# Patient Record
Sex: MALE | Race: WHITE
[De-identification: names, ages, dates, MRNs, and addresses within clinical notes are randomized per-mention and may not be internally consistent; named-entity substitution may affect disease eponyms.]

---

## 2017-09-05 ENCOUNTER — HOSPITAL ENCOUNTER (OUTPATIENT)
Dept: HOSPITAL 61 - KCIC | Age: 52
Discharge: HOME | End: 2017-09-05
Attending: FAMILY MEDICINE
Payer: COMMERCIAL

## 2017-09-05 DIAGNOSIS — J45.30: Primary | ICD-10-CM

## 2017-09-05 PROCEDURE — 71020: CPT

## 2017-09-05 NOTE — [facility]
CHEST PA   LATERAL

 

History: Asthma, cough for about 12 months

 

Comparison: None.

 

Findings:

There is no dependent pleural fluid or pneumothorax. There is opacity of 

the mid left hemithorax with ill-defined left heart border. Cardiac 

silhouette is within normal limits.

 

Impression: 

 

1.  There is abnormal opacity of the mid left hemithorax with obscuration 

of left heart border which may be due to to underlying infiltrate of the 

left upper lobe, no older exams to evaluate for change. Follow-up after 

treatment is advised unless old outside facility exams exams to confirm 

stability

 

Electronically signed by: Geraldo Hernandez MD (9/5/2017 1:50 PM) [facility]

## 2020-01-30 ENCOUNTER — HOSPITAL ENCOUNTER (OUTPATIENT)
Dept: HOSPITAL 61 - KCIC | Age: 55
Discharge: HOME | End: 2020-01-30
Attending: INTERNAL MEDICINE
Payer: COMMERCIAL

## 2020-01-30 DIAGNOSIS — J98.11: Primary | ICD-10-CM

## 2020-01-30 PROCEDURE — 71046 X-RAY EXAM CHEST 2 VIEWS: CPT

## 2020-01-30 NOTE — [facility]
EXAM: CHEST 2 VIEWS.

 

HISTORY: Productive cough.

 

COMPARISON: 09/05/2017, 06/29/2005.

 

FINDINGS: Frontal and lateral views of the chest are obtained.

 

A chronic opacity along the left perihilar region corresponds with chronic

left upper lobe atelectasis on examinations dating back through 2005. This

is not clearly changed. The left anterior fifth rib is bifid. A nodular 

focus projecting between the right anterior fifth and sixth ribs is also 

unchanged and may represent a nipple shadow. No acute infiltrates are 

identified. There is no pneumothorax or pleural effusion. The heart is not

enlarged.

 

IMPRESSION:

1. Atelectasis in the left upper lobe appears stable chronically. No acute

infiltrates are seen. CT is more sensitive if there is persistent concern.

 

Electronically signed by: HUEY Zpaata MD (1/30/2020 2:29 PM) [facility]

## 2020-03-05 ENCOUNTER — HOSPITAL ENCOUNTER (OUTPATIENT)
Dept: HOSPITAL 61 - KCIC CT | Age: 55
Discharge: HOME | End: 2020-03-05
Attending: INTERNAL MEDICINE
Payer: COMMERCIAL

## 2020-03-05 DIAGNOSIS — J18.8: Primary | ICD-10-CM

## 2020-03-05 DIAGNOSIS — R91.8: ICD-10-CM

## 2020-03-05 DIAGNOSIS — J47.9: ICD-10-CM

## 2020-03-05 PROCEDURE — 71260 CT THORAX DX C+: CPT

## 2020-03-05 NOTE — [facility]
EXAM: CT Chest with IV contrast

 

INDICATION: Pulmonary infiltrate.

 

TECHNIQUE:  Multi-detector row CT images were acquired from the thoracic 

inlet through the upper abdomen with the use of IV contrast. Sagittal and 

coronal images were acquired from the transaxial data. All CT scans 

performed at this facility utilize dose optimization techniques as 

appropriate to the exam, including the following: Automated exposure 

control and adjustment of the mA and/or KV according to patient size (this

includes techniques or standardized protocols for targeted exams where 

dose is indication/reason for exam).

 

IV CONTRAST: Administered

 

COMPARISON: Chest CT of 6/29/2005 and chest x-ray of 1/30/2020.

 

FINDINGS: 

CARDIOVASCULAR:  Unremarkable

 

MEDIASTINUM & KRISTINE: No adenopathy or masses.

 

LUNGS: Progression in volume loss and consolidation in the left upper lobe

with wedge-shaped groundglass attenuation and air bronchograms. There is 

some mild bronchiectasis but no cavitation or air-fluid levels. Additional

patchy groundglass opacities are present in the right middle and posterior

right upper lobe.

 

PLEURAL SPACE: No pleural effusions or pneumothorax.

 

OSSEOUS & SOFT TISSUE: Unremarkable

 

ABDOMEN:  The visualized portions of the upper abdomen are unremarkable.

 

IMPRESSION:

 

Multifocal pneumonia, primarily involving the left upper lobe where it is 

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

Follow-up to resolution is recommended. Malignancy considered less likely.

 

 

**********FOR INTERNAL CODING PURPOSES**********

 

Critical result:

 

Findings discussed with  GABBY LEHMAN at 3/5/2020 4:36 PM.

 

RESULT CODE: (C)  

 

 

 

 

 

 

Electronically signed by: Bambi Monroe MD (3/5/2020 4:41 PM) 

[id number]

## 2020-03-10 ENCOUNTER — HOSPITAL ENCOUNTER (OUTPATIENT)
Dept: HOSPITAL 61 - SURG | Age: 55
Discharge: HOME | End: 2020-03-10
Attending: INTERNAL MEDICINE
Payer: COMMERCIAL

## 2020-03-10 VITALS
SYSTOLIC BLOOD PRESSURE: 126 MMHG | DIASTOLIC BLOOD PRESSURE: 74 MMHG | DIASTOLIC BLOOD PRESSURE: 74 MMHG | SYSTOLIC BLOOD PRESSURE: 126 MMHG

## 2020-03-10 DIAGNOSIS — E78.5: ICD-10-CM

## 2020-03-10 DIAGNOSIS — Z79.899: ICD-10-CM

## 2020-03-10 DIAGNOSIS — K21.9: ICD-10-CM

## 2020-03-10 DIAGNOSIS — I10: ICD-10-CM

## 2020-03-10 DIAGNOSIS — Z98.52: ICD-10-CM

## 2020-03-10 DIAGNOSIS — E78.00: ICD-10-CM

## 2020-03-10 DIAGNOSIS — J98.09: Primary | ICD-10-CM

## 2020-03-10 DIAGNOSIS — Z98.890: ICD-10-CM

## 2020-03-10 DIAGNOSIS — J18.9: ICD-10-CM

## 2020-03-10 DIAGNOSIS — J45.909: ICD-10-CM

## 2020-03-10 LAB
APTT BLD: 28 SEC (ref 24–38)
BASOPHILS # BLD AUTO: 0.1 X10^3/UL (ref 0–0.2)
BASOPHILS NFR BLD: 1 % (ref 0–3)
EOSINOPHIL NFR BLD: 0.4 X10^3/UL (ref 0–0.7)
EOSINOPHIL NFR BLD: 5 % (ref 0–3)
ERYTHROCYTE [DISTWIDTH] IN BLOOD BY AUTOMATED COUNT: 15.2 % (ref 11.5–14.5)
HCT VFR BLD CALC: 46.2 % (ref 39–53)
HGB BLD-MCNC: 15.4 G/DL (ref 13–17.5)
LYMPHOCYTES # BLD: 0.9 X10^3/UL (ref 1–4.8)
LYMPHOCYTES NFR BLD AUTO: 13 % (ref 24–48)
MCH RBC QN AUTO: 30 PG (ref 25–35)
MCHC RBC AUTO-ENTMCNC: 33 G/DL (ref 31–37)
MCV RBC AUTO: 91 FL (ref 79–100)
MONO #: 0.8 X10^3/UL (ref 0–1.1)
MONOCYTES NFR BLD: 11 % (ref 0–9)
NEUT #: 5.3 X10^3/UL (ref 1.8–7.7)
NEUTROPHILS NFR BLD AUTO: 71 % (ref 31–73)
PLATELET # BLD AUTO: 215 X10^3/UL (ref 140–400)
PROTHROMBIN TIME: 12.5 SEC (ref 11.7–14)
RBC # BLD AUTO: 5.11 X10^6/UL (ref 4.3–5.7)
WBC # BLD AUTO: 7.5 X10^3/UL (ref 4–11)

## 2020-03-10 PROCEDURE — 85025 COMPLETE CBC W/AUTO DIFF WBC: CPT

## 2020-03-10 PROCEDURE — 31624 DX BRONCHOSCOPE/LAVAGE: CPT

## 2020-03-10 PROCEDURE — 94640 AIRWAY INHALATION TREATMENT: CPT

## 2020-03-10 PROCEDURE — 31622 DX BRONCHOSCOPE/WASH: CPT

## 2020-03-10 PROCEDURE — 85730 THROMBOPLASTIN TIME PARTIAL: CPT

## 2020-03-10 PROCEDURE — 87102 FUNGUS ISOLATION CULTURE: CPT

## 2020-03-10 PROCEDURE — 82787 IGG 1 2 3 OR 4 EACH: CPT

## 2020-03-10 PROCEDURE — 36415 COLL VENOUS BLD VENIPUNCTURE: CPT

## 2020-03-10 PROCEDURE — 82103 ALPHA-1-ANTITRYPSIN TOTAL: CPT

## 2020-03-10 PROCEDURE — 87116 MYCOBACTERIA CULTURE: CPT

## 2020-03-10 PROCEDURE — 85610 PROTHROMBIN TIME: CPT

## 2020-03-10 PROCEDURE — 87205 SMEAR GRAM STAIN: CPT

## 2020-03-10 PROCEDURE — 88112 CYTOPATH CELL ENHANCE TECH: CPT

## 2020-03-10 PROCEDURE — 87070 CULTURE OTHR SPECIMN AEROBIC: CPT

## 2020-03-10 PROCEDURE — 82784 ASSAY IGA/IGD/IGG/IGM EACH: CPT

## 2020-03-10 NOTE — OP
DATE OF SURGERY:  03/10/2020



PROCEDURE:  Bronchoscopy.



INDICATIONS:  Mucus plug, abnormal CT chest.



DESCRIPTION OF PROCEDURE:  Informed consent was obtained from the patient.  All

risks and benefits were explained.  He agreed to proceed with the procedure. 

Propofol was used for sedation by Anesthesia.  Bronchoscope was introduced

through the right nostril.  The upper airway was passed.  Vocal cords moves

equally with respiration.  The trachea was entered.  Copious amount of white

secretion was seen at the distal trachea as well as at the mainstem and at the

damian.  Right lung was first examined and secretions from the proximal right

mainstem were aspirated and removed.  Upon inspection of the right upper, right

middle and right lower lobe, no purulent secretions seen.  The airway appeared

patent and no endobronchial lesion seen.  Bronchoscope was then introduced into

the left lung.  Again seen were copious amount of white secretions in the left

main stem bronchus.  Upon further inspection of the left lung, the lingula

appears very inflamed and slightly narrow.  No definite endobronchial lesion

seen.  Bronchoalveolar lavage x 2 was performed from multiple subsegments of the

lingula.  The left upper lobe had minimal white secretions and the left lower

lobe, especially superior segment, also had minimal white secretions.  They all

were aspirated.  No endobronchial lesion seen.  The patient tolerated the

procedure well.



IMPRESSION:

1.  Copious amount of white secretions seen in the distal trachea, damian and

both the main stem bronchus.  The secretions were also present in the lingula.

2.  Abnormal lingula with inflamed mucosa and narrowing of the lumen suggesting

bronchomalacia vs related to bronchial atresia.  No definite endobronchial 
lesion seen.

3.  Bronchoalveolar lavage performed from the lingula x 2.  Bronchial wash was

also performed from the main stem bronchus.  We will follow the culture results.

 



______________________________

GABBY LEHMAN MD



DR:  GIOVANA/teresa  JOB#:  [id number] / [id number]

DD:  03/10/2020 13:15  DT:  03/10/2020 13:33

DARA

## 2020-03-11 LAB
IGG SERPL-MCNC: 842 MG/DL (ref 700–1600)
IGG1 SER-MCNC: 483 MG/DL (ref 248–810)
IGG2 SER-MCNC: 271 MG/DL (ref 130–555)
IGG3 SER-MCNC: 29 MG/DL (ref 15–102)
IGG4 SER-MCNC: 12 MG/DL (ref 2–96)

## 2020-03-11 NOTE — PATHOLOGY
Note

[facility] Accession Number: [id number]

   TESTS               RESULT  FLAG  UNITS    REF RANGE  LAB

------------------------------------------------------------

   Clinician Provided Cytology Information

   No. of containers..01 Other (Miscellaneous)

Source:                                                   01

   BRONCH WASH MAINSTM

DIAGNOSIS:                                                02

   BRONCH WASH MAINSTM

   NEGATIVE FOR MALIGNANT CELLS.

   PULMONARY MACROPHAGES (DUST CELLS) ARE PRESENT.

   NORMAL BRONCHIAL CELLS AND MACROPHAGES ARE PRESENT.

Signed out by:                                            02

   Aleksey Vogel MD, Pathologist

   NPI- [id number]

Performed by:                                             01

   Ct Ross, Cytotechnologist ([facility])

Gross description:                                        01

   5ML, CLEAR COLORLESS, 1 TP

   /LCS  03/10/2020  1850 Local



------------------------------------------------------------

    FLAG LEGEND:

    L-Low Normal,H-High Normal,LL-Alert Low,HH-Alert High

    <-Panic Low,>-Panic High,A-Abnormal,AA-Critical Abnormal

------------------------------------------------------------



Performed at:

[address]

   [place]  [address]

   Alex Ring MD, Phone: [phone]

02 [facility]

   [address] [place]  [address]

   Aleksey Vogel MD, Phone: [phone]

Specimen Comment: A courtesy copy of this report has been sent to [phone]

Specimen Comment: Report sent to 

Specimen Comment: A duplicate report has been generated due to demographic updates.

***Performed at:  01

   [address], [place], [place]  [id number]

   MD Alex Ring MD Phone:  [phone]

## 2020-03-11 NOTE — PATHOLOGY
Note

[facility] Accession Number: [id number]

   TESTS               RESULT  FLAG  UNITS    REF RANGE  LAB

------------------------------------------------------------

   Clinician Provided Cytology Information

   No. of containers..01 Other (Miscellaneous)

Source:                                                   01

   BAL LINGULA

DIAGNOSIS:                                                02

   BAL LINGULA

   NEGATIVE FOR MALIGNANT CELLS.

   FOCALLY REACTIVE BRONCHIAL EPITHELIAL CELLS, PULMONARY MACROPHAGES, AND

   NEUTROPHILS PRESENT.

   CELLULAR DEGENERATION IS PRESENT.

Signed out by:                                            02

   Aleksey Vogel MD, Pathologist

   NPI- [id number]

Performed by:                                             01

   Ct Ross, Cytotechnologist ([facility])

Gross description:                                        01

   5ML, PINK, 1 TP

   /LCS  03/10/2020  1852 Local



------------------------------------------------------------

    FLAG LEGEND:

    L-Low Normal,H-High Normal,LL-Alert Low,HH-Alert High

    <-Panic Low,>-Panic High,A-Abnormal,AA-Critical Abnormal

------------------------------------------------------------



Performed at:

[address]

   [place]  [address]

   Alex Ring MD, Phone: [phone]

[address] [facility]  [address]

   Aleksey Vogel MD, Phone: [phone]

***Performed at:  01

   [address], [place], [place]  [id number]

   MD Alex Ring MD Phone:  [phone]

## 2020-08-25 ENCOUNTER — HOSPITAL ENCOUNTER (OUTPATIENT)
Dept: HOSPITAL 61 - LAB | Age: 55
Discharge: HOME | End: 2020-08-25
Attending: INTERNAL MEDICINE
Payer: COMMERCIAL

## 2020-08-25 DIAGNOSIS — Z20.828: ICD-10-CM

## 2020-08-25 DIAGNOSIS — Z12.11: ICD-10-CM

## 2020-08-25 DIAGNOSIS — Z01.812: Primary | ICD-10-CM

## 2020-08-25 PROCEDURE — 87426 SARSCOV CORONAVIRUS AG IA: CPT

## 2020-08-26 ENCOUNTER — HOSPITAL ENCOUNTER (OUTPATIENT)
Dept: HOSPITAL 61 - ENDOS | Age: 55
Discharge: HOME | End: 2020-08-26
Attending: INTERNAL MEDICINE
Payer: COMMERCIAL

## 2020-08-26 VITALS
SYSTOLIC BLOOD PRESSURE: 108 MMHG | SYSTOLIC BLOOD PRESSURE: 108 MMHG | DIASTOLIC BLOOD PRESSURE: 76 MMHG | DIASTOLIC BLOOD PRESSURE: 76 MMHG | SYSTOLIC BLOOD PRESSURE: 108 MMHG | DIASTOLIC BLOOD PRESSURE: 76 MMHG | DIASTOLIC BLOOD PRESSURE: 76 MMHG | DIASTOLIC BLOOD PRESSURE: 76 MMHG | SYSTOLIC BLOOD PRESSURE: 108 MMHG | SYSTOLIC BLOOD PRESSURE: 108 MMHG | SYSTOLIC BLOOD PRESSURE: 108 MMHG | DIASTOLIC BLOOD PRESSURE: 76 MMHG | DIASTOLIC BLOOD PRESSURE: 76 MMHG | SYSTOLIC BLOOD PRESSURE: 108 MMHG

## 2020-08-26 DIAGNOSIS — D12.4: ICD-10-CM

## 2020-08-26 DIAGNOSIS — Z82.49: ICD-10-CM

## 2020-08-26 DIAGNOSIS — K27.9: ICD-10-CM

## 2020-08-26 DIAGNOSIS — K64.0: ICD-10-CM

## 2020-08-26 DIAGNOSIS — Z12.11: Primary | ICD-10-CM

## 2020-08-26 DIAGNOSIS — K21.0: ICD-10-CM

## 2020-08-26 DIAGNOSIS — K29.70: ICD-10-CM

## 2020-08-26 DIAGNOSIS — I10: ICD-10-CM

## 2020-08-26 DIAGNOSIS — K21.9: ICD-10-CM

## 2020-08-26 DIAGNOSIS — E78.5: ICD-10-CM

## 2020-08-26 DIAGNOSIS — Z83.3: ICD-10-CM

## 2020-08-26 PROCEDURE — 43239 EGD BIOPSY SINGLE/MULTIPLE: CPT

## 2020-08-26 PROCEDURE — 45380 COLONOSCOPY AND BIOPSY: CPT

## 2020-08-26 NOTE — CONS
DATE OF CONSULTATION:  08/26/2020



REFERRING PHYSICIAN:  LYNN Patterson



REASON FOR CONSULTATION:  Gastroesophageal reflux disease and screening.



HISTORY OF PRESENT ILLNESS:  A 55-year-old  male with past medical

history significant for GERD, hypertension, hyperlipidemia, seen for screening

colon exam.  Bowel habits are regular without diarrhea or constipation.  There

has been no melena or hematochezia.  Weight and appetite are stable.  He has

occasional heartburn despite Pepcid therapy.  Risk factors for reflux, positive

for caffeine, but negative for alcohol and nicotine.  Continued issues, he

requests additional evaluation.



PAST MEDICAL HISTORY:  Hyperlipidemia, GERD, hypertension.



ALLERGIES:  None.



MEDICATIONS:  Include atorvastatin, Pepcid, Flonase, and Micardis.



FAMILY AND SOCIAL HISTORY:  Significant for cerebrovascular disease with mother,

diabetes with grandmother.  Hypertension with multiple family members and MI

with grandfather.



SOCIAL HISTORY:  Nondrinker, nonsmoker.



PAST SURGICAL HISTORY:  Noncontributory.



REVIEW OF SYSTEMS:  Per records.



PHYSICAL EXAMINATION:

GENERAL:  Reveals a well-nourished, well-developed  male who is alert,

cooperative, in no acute distress.

VITAL SIGNS:  Temperature 97.4, pulse 86, respirations 20.

LUNGS:  Clear.

CARDIOVASCULAR:  Reveals an S1, S2 without S3, S4 or appreciable murmur.

ABDOMEN:  Reveals a soft abdomen.  Normal bowel sounds, without appreciable

hepatosplenomegaly.

EXTREMITIES:  Reveal no cyanosis, clubbing or edema.



IMPRESSION:

1.  Colorectal screening is warranted at this time.  Risks and benefits of

procedure including risk of hemorrhage and perforation during the operation were

discussed.  The patient is willing to proceed.

2.  Gastroesophageal reflux disease with breakthrough symptoms, Hancock's,

achalasia, peptic ulcer disease, malignancy, gastroparesis in the differential. 

Recommend upper endoscopy with possible biopsy.  Risks and benefits discussed

with the patient, is willing to proceed at this time.

 



______________________________

ALINA DAHS MD



DR:  SSP/teresa  JOB#:  [id number] / [id number]

DD:  08/26/2020 07:55  DT:  08/26/2020 10:07

## 2020-08-28 NOTE — PATHOLOGY
[facility] Accession Number: [id number]

.                                                                01

Material submitted:                                        .

PART A: esophagus - DISTAL ESOPHAGEAL BIOPSIES. Modifiers: distal

PART B: colon - DESCENDING COLON POLYP BIOPSY. Modifiers: descending

.                                                                01

Clinical history:                                          .

GERD/SCREENING

HEARTBURN, CRC SCREENING

.                                                                02

**********************************************************************

Diagnosis:

A. Esophagus "distal", endoscopic biopsy:

   - Esophageal squamous mucosa with features of reflux esophagitis.

   - Negative for intestinal metaplasia, dysplasia, and malignancy.

.

B. Large bowel, "descending colon polyp", endocervical biopsy:

   - Tubular adenoma; negative for high-grade dysplasia and malignancy.

   - Polypoid segment of large bowel mucosa with prominent intramucosal

      lymphoid aggregate; negative for dysplasia and malignancy.

.

(MLK:jose; 08/28/2020)

[facility]  08/28/2020  1526 Local

**********************************************************************

.                                                                02

Electronically signed:                                     .

Pia Tesfaye MD, Pathologist

NPI- [id number]

.                                                                01

Gross description:                                         .

A.  The specimen is received in formalin, labeled "Johnny Skinner",

"distal esophageal biopsies".  Received are multiple segments of pale

white-gray soft tissue, ranging in size from 0.1 cm to 0.3 cm.  The

specimen is entirely submitted in cassette A1.

.

B.  The specimen is received in formalin, labeled and "Johnny Skinner",

"descending colon polyp biopsy".  Received are multiple segments of pale

tan soft tissue, ranging in size from 0.1 cm to 0.2 cm.  The specimen is

entirely submitted in cassette B1.(SNA; 8/26/2020)

JOSLYN/MIO  08/26/2020  1825 Local

.                                                                02

Pathologist provided ICD-10:

D12.4, K21.9

.                                                                02

CPT                                                        .

740300, 535487

Specimen Comment: A courtesy copy of this report has been sent to [phone]

Specimen Comment: [id number]

Specimen Comment: Report sent to  / DR MERCER

***Performed at:  01

   [facility]

   [address]  [id number]

   MD Alex Ring MD Phone:  [phone]

***Performed at:  02

   [facility]

   [address] [place], [place]  [address]

   MD Aleksey Vogel MD Phone:  [phone]

## 2020-10-08 ENCOUNTER — HOSPITAL ENCOUNTER (OUTPATIENT)
Dept: HOSPITAL 61 - KCIC CT | Age: 55
End: 2020-10-08
Attending: INTERNAL MEDICINE
Payer: COMMERCIAL

## 2020-10-08 DIAGNOSIS — J47.9: ICD-10-CM

## 2020-10-08 DIAGNOSIS — I25.10: ICD-10-CM

## 2020-10-08 DIAGNOSIS — J18.9: ICD-10-CM

## 2020-10-08 DIAGNOSIS — R91.1: Primary | ICD-10-CM

## 2020-10-08 PROCEDURE — 71250 CT THORAX DX C-: CPT

## 2020-11-12 ENCOUNTER — HOSPITAL ENCOUNTER (OUTPATIENT)
Dept: HOSPITAL 61 - LAB | Age: 55
End: 2020-11-12
Attending: INTERNAL MEDICINE
Payer: COMMERCIAL

## 2020-11-12 DIAGNOSIS — R06.00: ICD-10-CM

## 2020-11-12 DIAGNOSIS — Z20.828: ICD-10-CM

## 2020-11-12 DIAGNOSIS — Z01.812: Primary | ICD-10-CM

## 2020-11-12 PROCEDURE — U0003 INFECTIOUS AGENT DETECTION BY NUCLEIC ACID (DNA OR RNA); SEVERE ACUTE RESPIRATORY SYNDROME CORONAVIRUS 2 (SARS-COV-2) (CORONAVIRUS DISEASE [COVID-19]), AMPLIFIED PROBE TECHNIQUE, MAKING USE OF HIGH THROUGHPUT TECHNOLOGIES AS DESCRIBED BY CMS-2020-01-R: HCPCS

## 2020-11-16 ENCOUNTER — HOSPITAL ENCOUNTER (OUTPATIENT)
Dept: HOSPITAL 61 - PF | Age: 55
End: 2020-11-16
Attending: INTERNAL MEDICINE
Payer: COMMERCIAL

## 2020-11-16 DIAGNOSIS — R06.00: Primary | ICD-10-CM

## 2020-11-16 PROCEDURE — 94729 DIFFUSING CAPACITY: CPT

## 2020-11-16 PROCEDURE — 94640 AIRWAY INHALATION TREATMENT: CPT

## 2020-11-16 PROCEDURE — 94726 PLETHYSMOGRAPHY LUNG VOLUMES: CPT

## 2020-11-16 PROCEDURE — 94060 EVALUATION OF WHEEZING: CPT

## 2020-11-20 NOTE — RESP
DATE OF SERVICE:  11/16/2020



ATTENDING PHYSICIAN:  George Meeks MD



The patient underwent full pulmonary function testing.  FEV1 to FVC ratio was

61%, FEV1 was 69% of predicted, 2.77 liters.  There was no significant

bronchodilator response.  Residual volume and total lung capacity was elevated. 

Diffusion capacity was preserved.



IMPRESSION:

1.  Moderate airflow limitation, no significant bronchodilator response.

2.  Elevated residual volume compatible with air trapping.

3.  Preserved diffusion capacity.

 



______________________________

JAROD QUILES MD



DR:  QUINCY/teresa  JOB#:  [id number] / [id number]

DD:  11/20/2020 10:39  DT:  11/20/2020 10:46

## 2020-11-25 ENCOUNTER — HOSPITAL ENCOUNTER (OUTPATIENT)
Dept: HOSPITAL 61 - KCIC CT | Age: 55
End: 2020-11-25
Attending: INTERNAL MEDICINE
Payer: COMMERCIAL

## 2020-11-25 DIAGNOSIS — J47.9: ICD-10-CM

## 2020-11-25 DIAGNOSIS — R91.1: Primary | ICD-10-CM

## 2020-11-25 PROCEDURE — 71250 CT THORAX DX C-: CPT

## 2020-11-25 NOTE — [facility]
PQRS Compliance Statement:

 

One or more of the following individualized dose reduction techniques were

utilized for this examination:  

1. Automated exposure control  

2. Adjustment of the mA and/or kV according to patient size  

3. Use of iterative reconstruction technique

 

 

CT CHEST WO CONTRAST

 

Clinical Indication: Reason: Left lung abnormality. Follow up. / 

 

Comparison: CT chest without contrast, October 8, 2020.

 

TECHNIQUE: Helical CT imaging of the chest is performed without IV 

contrast.

 

Findings: 

There is no mediastinal or axillary adenopathy. The great vessels are 

normal caliber. There is coronary artery disease. Cardiac size is normal, 

no pericardial effusion.

 

Groundglass nodule in the posterior right lung apex is stable to slightly 

larger, image 16. Groundglass nodule in the posterior right upper lobe has

decreased central density. Groundglass nodule in the right middle lobe is 

unchanged. Ggroundglass and nodular opacities slightly more inferiorly in 

the right middle lobe are unchanged.

 

Left upper lobe consolidation with bronchiectasis and mucous plugging and 

groundglass opacities is not significant changed. There is central 

bronchial stenosis of the anterior segment of the left upper lobe. 4 

subsegmental groundglass nodules in the periphery of the left lower lobe 

are unchanged. 2 tiny groundglass nodules in the lingula are unchanged.

 

The visualized upper abdomen and bones are unremarkable.

 

IMPRESSION:

1.  Chronic consolidation and groundglass opacities in the left upper lobe

are unchanged.

2.  Scattered predominantly groundglass nodules are unchanged.

 

Electronically signed by: Lon Tubbs MD (11/25/2020 6:04 PM) [facility]

## 2021-01-07 ENCOUNTER — HOSPITAL ENCOUNTER (OUTPATIENT)
Dept: HOSPITAL 35 - LAB | Age: 56
End: 2021-01-07
Attending: THORACIC SURGERY (CARDIOTHORACIC VASCULAR SURGERY)
Payer: COMMERCIAL

## 2021-01-07 DIAGNOSIS — Z01.812: Primary | ICD-10-CM

## 2021-01-07 DIAGNOSIS — Z20.828: ICD-10-CM

## 2021-01-07 LAB
ALBUMIN SERPL-MCNC: 4.1 G/DL (ref 3.4–5)
ALT SERPL-CCNC: 54 U/L (ref 16–63)
ANION GAP SERPL CALC-SCNC: 9 MMOL/L (ref 7–16)
APTT BLD: 26 SECONDS (ref 24.5–32.8)
AST SERPL-CCNC: 24 U/L (ref 15–37)
BASOPHILS NFR BLD AUTO: 0.7 % (ref 0–2)
BILIRUB SERPL-MCNC: 0.5 MG/DL (ref 0.2–1)
BILIRUB UR-MCNC: NEGATIVE MG/DL
BUN SERPL-MCNC: 15 MG/DL (ref 7–18)
CALCIUM SERPL-MCNC: 9.3 MG/DL (ref 8.5–10.1)
CHLORIDE SERPL-SCNC: 103 MMOL/L (ref 98–107)
CO2 SERPL-SCNC: 25 MMOL/L (ref 21–32)
COLOR UR: YELLOW
CREAT SERPL-MCNC: 1.2 MG/DL (ref 0.7–1.3)
EOSINOPHIL NFR BLD: 4.7 % (ref 0–3)
ERYTHROCYTE [DISTWIDTH] IN BLOOD BY AUTOMATED COUNT: 15.1 % (ref 10.5–14.5)
GLUCOSE SERPL-MCNC: 170 MG/DL (ref 74–106)
GRANULOCYTES NFR BLD MANUAL: 61.2 % (ref 36–66)
HCT VFR BLD CALC: 46.9 % (ref 42–52)
HGB BLD-MCNC: 15.4 GM/DL (ref 14–18)
INR PPP: 1
KETONES UR STRIP-MCNC: NEGATIVE MG/DL
LYMPHOCYTES NFR BLD AUTO: 17.8 % (ref 24–44)
MCH RBC QN AUTO: 30 PG (ref 26–34)
MCHC RBC AUTO-ENTMCNC: 32.9 G/DL (ref 28–37)
MCV RBC: 91.2 FL (ref 80–100)
MONOCYTES NFR BLD: 15.6 % (ref 1–8)
NEUTROPHILS # BLD: 3.3 THOU/UL (ref 1.4–8.2)
PLATELET # BLD: 232 THOU/UL (ref 150–400)
POTASSIUM SERPL-SCNC: 4.1 MMOL/L (ref 3.5–5.1)
PROT SERPL-MCNC: 7.2 G/DL (ref 6.4–8.2)
PROTHROMBIN TIME: 10.5 SECONDS (ref 9.3–11.4)
RBC # BLD AUTO: 5.14 MIL/UL (ref 4.5–6)
RBC # UR STRIP: NEGATIVE /UL
SODIUM SERPL-SCNC: 137 MMOL/L (ref 136–145)
SP GR UR STRIP: <= 1.005 (ref 1–1.03)
URINE CLARITY: CLEAR
URINE GLUCOSE-RANDOM*: NEGATIVE
URINE LEUKOCYTES-REFLEX: NEGATIVE
URINE NITRITE-REFLEX: NEGATIVE
URINE PROTEIN (DIPSTICK): NEGATIVE
UROBILINOGEN UR STRIP-ACNC: 0.2 E.U./DL (ref 0.2–1)
WBC # BLD AUTO: 5.4 THOU/UL (ref 4–11)

## 2021-01-08 NOTE — EKG
[address]  [address]
Phone:  [phone]                    ELECTROCARDIOGRAM REPORT      
_______________________________________________________________________________
 
Name:       VANCE CHOW                 Room #:                     PRE IN  
M.R.#:      [id number]     Account #:      [id number]  
Admission:              Attend Phys:    Kyle Weems MD    
Discharge:              Date of Birth:  [birth year]  
                                                          Report #: [id number]
   [id number]
_______________________________________________________________________________
                          [facility]
                                       
Test Date:    2021               Test Time:    14:11:34
Pat Name:     VANCE CHOW            Department:   
Patient ID:   [id number]            Room:          
Gender:       M                        Technician:   DANIELLE TIRADO
:          1965               Requested By: Kyle Weems
Order Number: [id number] MD:   Wilton Mathias
                                 Measurements
Intervals                              Axis          
Rate:         101                      P:            58
[place]:           142                      QRS:          46
QRSD:         80                       T:            
QT:           329                                    
QTc:          427                                    
                           Interpretive Statements
Sinus tachycardia
Borderline T wave abnormalities
No previous ECG available for comparison
Electronically Signed On 2021 7:16:23 CST by Wilton Mathias
[email or web address]
 
 
 
 
 
 
 
 
 
 
 
 
 
 
 
 
 
 
 
 
 
 
  <ELECTRONICALLY SIGNED>
   By: Wilton Mathias MD, [facility]    
  21     0716
D: 21 1411                           _____________________________________
T: 21 1411                           Wilton Mathias MD, FACC      /EPI

## 2021-01-11 ENCOUNTER — HOSPITAL ENCOUNTER (INPATIENT)
Dept: HOSPITAL 35 - PRE | Age: 56
LOS: 7 days | Discharge: HOME | DRG: 164 | End: 2021-01-18
Attending: THORACIC SURGERY (CARDIOTHORACIC VASCULAR SURGERY) | Admitting: THORACIC SURGERY (CARDIOTHORACIC VASCULAR SURGERY)
Payer: COMMERCIAL

## 2021-01-11 VITALS — BODY MASS INDEX: 29.42 KG/M2 | WEIGHT: 222 LBS | HEIGHT: 73 IN

## 2021-01-11 DIAGNOSIS — C34.12: Primary | ICD-10-CM

## 2021-01-11 DIAGNOSIS — K56.7: ICD-10-CM

## 2021-01-11 DIAGNOSIS — E78.5: ICD-10-CM

## 2021-01-11 DIAGNOSIS — I10: ICD-10-CM

## 2021-01-11 PROCEDURE — 52301 CYSTOSCOPY AND TREATMENT: CPT

## 2021-01-11 PROCEDURE — 10078: CPT

## 2021-01-11 PROCEDURE — 56527: CPT

## 2021-01-11 PROCEDURE — 65075: CPT

## 2021-01-11 PROCEDURE — 52191: CPT

## 2021-01-11 PROCEDURE — 52303: CPT

## 2021-01-11 PROCEDURE — 65040: CPT

## 2021-01-11 PROCEDURE — 50101: CPT

## 2021-01-11 PROCEDURE — 70005: CPT

## 2021-01-11 PROCEDURE — 62110: CPT

## 2021-01-11 PROCEDURE — 50497: CPT

## 2021-01-11 PROCEDURE — 50740 FUSION OF URETER & KIDNEY: CPT

## 2021-01-11 PROCEDURE — 56525: CPT

## 2021-01-11 PROCEDURE — 65105 REMOVE EYE/ATTACH IMPLANT: CPT

## 2021-01-11 PROCEDURE — 50739: CPT

## 2021-01-11 PROCEDURE — 50455: CPT

## 2021-01-11 PROCEDURE — 62900: CPT

## 2021-01-11 PROCEDURE — 56524: CPT

## 2021-01-11 PROCEDURE — 65020: CPT

## 2021-01-11 PROCEDURE — 51301: CPT

## 2021-01-11 PROCEDURE — 50386 REMOVE STENT VIA TRANSURETH: CPT

## 2021-01-11 PROCEDURE — 50010 RENAL EXPLORATION: CPT

## 2021-01-11 PROCEDURE — 10081 I&D PILONIDAL CYST COMP: CPT

## 2021-01-11 PROCEDURE — 50417: CPT

## 2021-01-11 PROCEDURE — 56526: CPT

## 2021-01-11 PROCEDURE — 56528: CPT

## 2021-01-11 PROCEDURE — 54118: CPT

## 2021-01-13 VITALS — SYSTOLIC BLOOD PRESSURE: 118 MMHG | DIASTOLIC BLOOD PRESSURE: 87 MMHG

## 2021-01-13 VITALS — DIASTOLIC BLOOD PRESSURE: 74 MMHG | SYSTOLIC BLOOD PRESSURE: 115 MMHG

## 2021-01-13 VITALS — SYSTOLIC BLOOD PRESSURE: 111 MMHG | DIASTOLIC BLOOD PRESSURE: 74 MMHG

## 2021-01-13 VITALS — SYSTOLIC BLOOD PRESSURE: 103 MMHG | DIASTOLIC BLOOD PRESSURE: 69 MMHG

## 2021-01-13 VITALS — DIASTOLIC BLOOD PRESSURE: 73 MMHG | SYSTOLIC BLOOD PRESSURE: 111 MMHG

## 2021-01-13 VITALS — SYSTOLIC BLOOD PRESSURE: 123 MMHG | DIASTOLIC BLOOD PRESSURE: 68 MMHG

## 2021-01-13 VITALS — DIASTOLIC BLOOD PRESSURE: 81 MMHG | SYSTOLIC BLOOD PRESSURE: 120 MMHG

## 2021-01-13 VITALS — SYSTOLIC BLOOD PRESSURE: 117 MMHG | DIASTOLIC BLOOD PRESSURE: 77 MMHG

## 2021-01-13 VITALS — DIASTOLIC BLOOD PRESSURE: 76 MMHG | SYSTOLIC BLOOD PRESSURE: 117 MMHG

## 2021-01-13 VITALS — DIASTOLIC BLOOD PRESSURE: 70 MMHG | SYSTOLIC BLOOD PRESSURE: 115 MMHG

## 2021-01-13 VITALS — SYSTOLIC BLOOD PRESSURE: 119 MMHG | DIASTOLIC BLOOD PRESSURE: 73 MMHG

## 2021-01-13 VITALS — DIASTOLIC BLOOD PRESSURE: 74 MMHG | SYSTOLIC BLOOD PRESSURE: 121 MMHG

## 2021-01-13 VITALS — SYSTOLIC BLOOD PRESSURE: 124 MMHG | DIASTOLIC BLOOD PRESSURE: 71 MMHG

## 2021-01-13 VITALS — DIASTOLIC BLOOD PRESSURE: 75 MMHG | SYSTOLIC BLOOD PRESSURE: 119 MMHG

## 2021-01-13 VITALS — SYSTOLIC BLOOD PRESSURE: 116 MMHG | DIASTOLIC BLOOD PRESSURE: 72 MMHG

## 2021-01-13 VITALS — SYSTOLIC BLOOD PRESSURE: 117 MMHG | DIASTOLIC BLOOD PRESSURE: 74 MMHG

## 2021-01-13 VITALS — SYSTOLIC BLOOD PRESSURE: 111 MMHG | DIASTOLIC BLOOD PRESSURE: 71 MMHG

## 2021-01-13 VITALS — SYSTOLIC BLOOD PRESSURE: 135 MMHG | DIASTOLIC BLOOD PRESSURE: 80 MMHG

## 2021-01-13 VITALS — DIASTOLIC BLOOD PRESSURE: 74 MMHG | SYSTOLIC BLOOD PRESSURE: 120 MMHG

## 2021-01-13 VITALS — DIASTOLIC BLOOD PRESSURE: 75 MMHG | SYSTOLIC BLOOD PRESSURE: 112 MMHG

## 2021-01-13 VITALS — SYSTOLIC BLOOD PRESSURE: 121 MMHG | DIASTOLIC BLOOD PRESSURE: 74 MMHG

## 2021-01-13 VITALS — SYSTOLIC BLOOD PRESSURE: 110 MMHG | DIASTOLIC BLOOD PRESSURE: 66 MMHG

## 2021-01-13 VITALS — DIASTOLIC BLOOD PRESSURE: 75 MMHG | SYSTOLIC BLOOD PRESSURE: 118 MMHG

## 2021-01-13 VITALS — DIASTOLIC BLOOD PRESSURE: 74 MMHG | SYSTOLIC BLOOD PRESSURE: 111 MMHG

## 2021-01-13 VITALS — DIASTOLIC BLOOD PRESSURE: 69 MMHG | SYSTOLIC BLOOD PRESSURE: 120 MMHG

## 2021-01-13 VITALS — DIASTOLIC BLOOD PRESSURE: 69 MMHG | SYSTOLIC BLOOD PRESSURE: 100 MMHG

## 2021-01-13 PROCEDURE — 0BBG0ZZ EXCISION OF LEFT UPPER LUNG LOBE, OPEN APPROACH: ICD-10-PCS | Performed by: THORACIC SURGERY (CARDIOTHORACIC VASCULAR SURGERY)

## 2021-01-13 PROCEDURE — 3E0T3BZ INTRODUCTION OF ANESTHETIC AGENT INTO PERIPHERAL NERVES AND PLEXI, PERCUTANEOUS APPROACH: ICD-10-PCS | Performed by: THORACIC SURGERY (CARDIOTHORACIC VASCULAR SURGERY)

## 2021-01-13 PROCEDURE — 0BJ08ZZ INSPECTION OF TRACHEOBRONCHIAL TREE, VIA NATURAL OR ARTIFICIAL OPENING ENDOSCOPIC: ICD-10-PCS | Performed by: THORACIC SURGERY (CARDIOTHORACIC VASCULAR SURGERY)

## 2021-01-13 NOTE — NUR
recieved from the PACU per bed drowsy but able to answerquestions. Placed in
room, placed on monitor. Asia rojo at present. Rhettene was off in
PACU so we will observe, Left Chest tube intact and to Atrium, draining
sanguanious fluid. Epidural intact no drainage noted,will comt to monitor.

## 2021-01-13 NOTE — NUR
ASSUMED CARE AT 1900, PCA PUMP CLEARED PER PROTOCOL WITH OFF-GOING RN.
EPIDURAL SITE C/D/I WITH MINIMAL BRUISING. THORACOTOMY AND CHEST TUBE SITES
C/D/I AS WELL.
2044-BP ELEVATED ON ART LINE WITH -160. TURNED CARDENE DRIP ON AT 2.5
MG/HR. SBP RETURNED TO <130 AND THEN CONTINUED TO DROP; AT 2054 TURNED CARDENE
BACK OFF. EDUCATED PT THAT HIGHER BP MIGHT BE MORE RELATED TO PAIN AND TO BE
SURE TO USE PCA PUMP.

## 2021-01-13 NOTE — NUR
Pt monique his diet well, no c/o of any nausea. States very thristy. monique. PCA and
Epidural. Will cont to monitor.

## 2021-01-14 VITALS — DIASTOLIC BLOOD PRESSURE: 74 MMHG | SYSTOLIC BLOOD PRESSURE: 124 MMHG

## 2021-01-14 VITALS — SYSTOLIC BLOOD PRESSURE: 115 MMHG | DIASTOLIC BLOOD PRESSURE: 71 MMHG

## 2021-01-14 VITALS — DIASTOLIC BLOOD PRESSURE: 77 MMHG | SYSTOLIC BLOOD PRESSURE: 131 MMHG

## 2021-01-14 VITALS — SYSTOLIC BLOOD PRESSURE: 112 MMHG | DIASTOLIC BLOOD PRESSURE: 69 MMHG

## 2021-01-14 VITALS — DIASTOLIC BLOOD PRESSURE: 77 MMHG | SYSTOLIC BLOOD PRESSURE: 129 MMHG

## 2021-01-14 VITALS — SYSTOLIC BLOOD PRESSURE: 114 MMHG | DIASTOLIC BLOOD PRESSURE: 70 MMHG

## 2021-01-14 VITALS — DIASTOLIC BLOOD PRESSURE: 59 MMHG | SYSTOLIC BLOOD PRESSURE: 104 MMHG

## 2021-01-14 VITALS — DIASTOLIC BLOOD PRESSURE: 71 MMHG | SYSTOLIC BLOOD PRESSURE: 108 MMHG

## 2021-01-14 VITALS — DIASTOLIC BLOOD PRESSURE: 64 MMHG | SYSTOLIC BLOOD PRESSURE: 112 MMHG

## 2021-01-14 VITALS — DIASTOLIC BLOOD PRESSURE: 69 MMHG | SYSTOLIC BLOOD PRESSURE: 112 MMHG

## 2021-01-14 VITALS — DIASTOLIC BLOOD PRESSURE: 69 MMHG | SYSTOLIC BLOOD PRESSURE: 111 MMHG

## 2021-01-14 VITALS — DIASTOLIC BLOOD PRESSURE: 75 MMHG | SYSTOLIC BLOOD PRESSURE: 120 MMHG

## 2021-01-14 VITALS — DIASTOLIC BLOOD PRESSURE: 63 MMHG | SYSTOLIC BLOOD PRESSURE: 103 MMHG

## 2021-01-14 VITALS — SYSTOLIC BLOOD PRESSURE: 122 MMHG | DIASTOLIC BLOOD PRESSURE: 77 MMHG

## 2021-01-14 VITALS — DIASTOLIC BLOOD PRESSURE: 77 MMHG | SYSTOLIC BLOOD PRESSURE: 124 MMHG

## 2021-01-14 VITALS — SYSTOLIC BLOOD PRESSURE: 106 MMHG | DIASTOLIC BLOOD PRESSURE: 71 MMHG

## 2021-01-14 VITALS — DIASTOLIC BLOOD PRESSURE: 76 MMHG | SYSTOLIC BLOOD PRESSURE: 111 MMHG

## 2021-01-14 VITALS — SYSTOLIC BLOOD PRESSURE: 106 MMHG | DIASTOLIC BLOOD PRESSURE: 61 MMHG

## 2021-01-14 VITALS — SYSTOLIC BLOOD PRESSURE: 121 MMHG | DIASTOLIC BLOOD PRESSURE: 78 MMHG

## 2021-01-14 VITALS — DIASTOLIC BLOOD PRESSURE: 75 MMHG | SYSTOLIC BLOOD PRESSURE: 122 MMHG

## 2021-01-14 VITALS — SYSTOLIC BLOOD PRESSURE: 116 MMHG | DIASTOLIC BLOOD PRESSURE: 76 MMHG

## 2021-01-14 VITALS — DIASTOLIC BLOOD PRESSURE: 72 MMHG | SYSTOLIC BLOOD PRESSURE: 124 MMHG

## 2021-01-14 VITALS — SYSTOLIC BLOOD PRESSURE: 127 MMHG | DIASTOLIC BLOOD PRESSURE: 87 MMHG

## 2021-01-14 VITALS — DIASTOLIC BLOOD PRESSURE: 73 MMHG | SYSTOLIC BLOOD PRESSURE: 112 MMHG

## 2021-01-14 VITALS — SYSTOLIC BLOOD PRESSURE: 107 MMHG | DIASTOLIC BLOOD PRESSURE: 56 MMHG

## 2021-01-14 VITALS — DIASTOLIC BLOOD PRESSURE: 72 MMHG | SYSTOLIC BLOOD PRESSURE: 114 MMHG

## 2021-01-14 VITALS — DIASTOLIC BLOOD PRESSURE: 72 MMHG | SYSTOLIC BLOOD PRESSURE: 90 MMHG

## 2021-01-14 VITALS — SYSTOLIC BLOOD PRESSURE: 105 MMHG | DIASTOLIC BLOOD PRESSURE: 62 MMHG

## 2021-01-14 VITALS — DIASTOLIC BLOOD PRESSURE: 67 MMHG | SYSTOLIC BLOOD PRESSURE: 108 MMHG

## 2021-01-14 VITALS — DIASTOLIC BLOOD PRESSURE: 77 MMHG | SYSTOLIC BLOOD PRESSURE: 130 MMHG

## 2021-01-14 VITALS — DIASTOLIC BLOOD PRESSURE: 68 MMHG | SYSTOLIC BLOOD PRESSURE: 119 MMHG

## 2021-01-14 VITALS — SYSTOLIC BLOOD PRESSURE: 120 MMHG | DIASTOLIC BLOOD PRESSURE: 80 MMHG

## 2021-01-14 VITALS — SYSTOLIC BLOOD PRESSURE: 117 MMHG | DIASTOLIC BLOOD PRESSURE: 75 MMHG

## 2021-01-14 VITALS — DIASTOLIC BLOOD PRESSURE: 75 MMHG | SYSTOLIC BLOOD PRESSURE: 127 MMHG

## 2021-01-14 LAB
ANION GAP SERPL CALC-SCNC: 9 MMOL/L (ref 7–16)
BUN SERPL-MCNC: 13 MG/DL (ref 7–18)
CALCIUM SERPL-MCNC: 7.9 MG/DL (ref 8.5–10.1)
CHLORIDE SERPL-SCNC: 103 MMOL/L (ref 98–107)
CO2 SERPL-SCNC: 26 MMOL/L (ref 21–32)
CREAT SERPL-MCNC: 1 MG/DL (ref 0.7–1.3)
ERYTHROCYTE [DISTWIDTH] IN BLOOD BY AUTOMATED COUNT: 15.3 % (ref 10.5–14.5)
GLUCOSE SERPL-MCNC: 130 MG/DL (ref 74–106)
HCT VFR BLD CALC: 39 % (ref 42–52)
HGB BLD-MCNC: 12.8 GM/DL (ref 14–18)
MCH RBC QN AUTO: 30.1 PG (ref 26–34)
MCHC RBC AUTO-ENTMCNC: 32.9 G/DL (ref 28–37)
MCV RBC: 91.6 FL (ref 80–100)
PLATELET # BLD: 189 THOU/UL (ref 150–400)
POTASSIUM SERPL-SCNC: 4.1 MMOL/L (ref 3.5–5.1)
RBC # BLD AUTO: 4.25 MIL/UL (ref 4.5–6)
SODIUM SERPL-SCNC: 138 MMOL/L (ref 136–145)
WBC # BLD AUTO: 9.6 THOU/UL (ref 4–11)

## 2021-01-14 NOTE — NUR
ASSUMED CARE AT 0700, ASSESSMENT AND VITAL SIGNS COMPLETED PER ICU PROTOCOL.
DR. STILES ROUNDED THIS AM, PLAN OF CARE DISCUSSED.  PT'S WIFE, MARLINE, WAS
BEDSIDE SINCE THIS MORNING.  RN WILL CONTINUE TO MONITOR.

## 2021-01-14 NOTE — NUR
ASSESSMENT: CM REVIEWED CHART AND MET WITH PT AND HIS WIFE AT THE BEDSIDE. PT
IS ALERT AND ORIENTED X4. PT IS S/P THORACTOMY WITH UPPER LOBECTOMY.  PT LIVES
IN A HOUSE WITH HIS WIFE. PT HAS 1-2STEPS TO ENTER THE HOME AND NO STEPS ONCE
INSIDE. PT DOES NOT HAVE ANY DME IN THE HOME. PT REPORTS HAVING A WALKER IN
SHOWER WITH A BUILT IN [place]. PT DENIES ANY HX OF HH IN THE PAST OR SNF. PT/OT
IS ORDERED AND EVALS ARE PENDING. PT REMAINS NPO UNTIL FLATUS. CM WILL
CONTINUE TO FOLLOW TO ASSIST WITH DISCHARGE NEEDS.

## 2021-01-15 VITALS — DIASTOLIC BLOOD PRESSURE: 75 MMHG | SYSTOLIC BLOOD PRESSURE: 122 MMHG

## 2021-01-15 VITALS — DIASTOLIC BLOOD PRESSURE: 85 MMHG | SYSTOLIC BLOOD PRESSURE: 130 MMHG

## 2021-01-15 VITALS — DIASTOLIC BLOOD PRESSURE: 78 MMHG | SYSTOLIC BLOOD PRESSURE: 121 MMHG

## 2021-01-15 VITALS — DIASTOLIC BLOOD PRESSURE: 63 MMHG | SYSTOLIC BLOOD PRESSURE: 96 MMHG

## 2021-01-15 VITALS — SYSTOLIC BLOOD PRESSURE: 101 MMHG | DIASTOLIC BLOOD PRESSURE: 63 MMHG

## 2021-01-15 VITALS — SYSTOLIC BLOOD PRESSURE: 110 MMHG | DIASTOLIC BLOOD PRESSURE: 70 MMHG

## 2021-01-15 VITALS — DIASTOLIC BLOOD PRESSURE: 78 MMHG | SYSTOLIC BLOOD PRESSURE: 120 MMHG

## 2021-01-15 VITALS — SYSTOLIC BLOOD PRESSURE: 140 MMHG | DIASTOLIC BLOOD PRESSURE: 78 MMHG

## 2021-01-15 VITALS — DIASTOLIC BLOOD PRESSURE: 77 MMHG | SYSTOLIC BLOOD PRESSURE: 116 MMHG

## 2021-01-15 VITALS — SYSTOLIC BLOOD PRESSURE: 122 MMHG | DIASTOLIC BLOOD PRESSURE: 68 MMHG

## 2021-01-15 VITALS — DIASTOLIC BLOOD PRESSURE: 73 MMHG | SYSTOLIC BLOOD PRESSURE: 131 MMHG

## 2021-01-15 VITALS — SYSTOLIC BLOOD PRESSURE: 121 MMHG | DIASTOLIC BLOOD PRESSURE: 68 MMHG

## 2021-01-15 VITALS — SYSTOLIC BLOOD PRESSURE: 121 MMHG | DIASTOLIC BLOOD PRESSURE: 78 MMHG

## 2021-01-15 VITALS — DIASTOLIC BLOOD PRESSURE: 83 MMHG | SYSTOLIC BLOOD PRESSURE: 131 MMHG

## 2021-01-15 VITALS — DIASTOLIC BLOOD PRESSURE: 89 MMHG | SYSTOLIC BLOOD PRESSURE: 128 MMHG

## 2021-01-15 VITALS — DIASTOLIC BLOOD PRESSURE: 74 MMHG | SYSTOLIC BLOOD PRESSURE: 121 MMHG

## 2021-01-15 VITALS — DIASTOLIC BLOOD PRESSURE: 73 MMHG | SYSTOLIC BLOOD PRESSURE: 115 MMHG

## 2021-01-15 VITALS — DIASTOLIC BLOOD PRESSURE: 69 MMHG | SYSTOLIC BLOOD PRESSURE: 108 MMHG

## 2021-01-15 VITALS — SYSTOLIC BLOOD PRESSURE: 110 MMHG | DIASTOLIC BLOOD PRESSURE: 67 MMHG

## 2021-01-15 VITALS — SYSTOLIC BLOOD PRESSURE: 121 MMHG | DIASTOLIC BLOOD PRESSURE: 75 MMHG

## 2021-01-15 VITALS — DIASTOLIC BLOOD PRESSURE: 76 MMHG | SYSTOLIC BLOOD PRESSURE: 122 MMHG

## 2021-01-15 VITALS — SYSTOLIC BLOOD PRESSURE: 136 MMHG | DIASTOLIC BLOOD PRESSURE: 83 MMHG

## 2021-01-15 VITALS — SYSTOLIC BLOOD PRESSURE: 123 MMHG

## 2021-01-15 VITALS — SYSTOLIC BLOOD PRESSURE: 129 MMHG | DIASTOLIC BLOOD PRESSURE: 76 MMHG

## 2021-01-15 VITALS — DIASTOLIC BLOOD PRESSURE: 78 MMHG | SYSTOLIC BLOOD PRESSURE: 119 MMHG

## 2021-01-15 VITALS — DIASTOLIC BLOOD PRESSURE: 80 MMHG | SYSTOLIC BLOOD PRESSURE: 142 MMHG

## 2021-01-15 VITALS — DIASTOLIC BLOOD PRESSURE: 70 MMHG | SYSTOLIC BLOOD PRESSURE: 117 MMHG

## 2021-01-15 VITALS — DIASTOLIC BLOOD PRESSURE: 57 MMHG | SYSTOLIC BLOOD PRESSURE: 103 MMHG

## 2021-01-15 NOTE — NUR
ON-GOING ASSESSMENT: CM REVIEWED CHART. PT WORKED WITH THERAPY YESTEDAY AND
ANTICIPATING PATIENT WILL BE ABLE TO GO HOME. PT ATTEMPTED TO WORK WITH
THERAPY TODAY BUT ON BED REST DUE TO CHEST TUBE REMOVAL. THEY WILL ATTEMPT TO
SEE PATIENT AGAIN. PT DOES NOT ANTICIPATE HAVING NEEDS FROM CM.

## 2021-01-15 NOTE — NUR
ASSUMED PT CARE AT 1900. VSS. PT A&0X4. ON 4L NC WITH SATS OF 95% PT ABLE TO
PULL IS . CHEST TUBE REMAINS SUTURED IN PLACE NO AIR LEAK OR CREPITUS
NOTED, ATRIUM TIDALING PRESENT. 40ML FROM CHEST TUBE THIS SHIFT.  PT IS
STABLE. SLEPT WELL OVER NOC. NO FURTHER COMPLAINTS THIS SHIFT. WILL CONTINUE
TO MONITOR PER POC.

## 2021-01-15 NOTE — NUR
0800 UP TO CHAIR WITH ASSISTANCE X1. ENCOURAGED PT TO COUGH/DEEP BREATHE AND
USE I.S. UP TO COMMODE AND HAD A SMALL LIQUID BM. HE STATES HE IS NOT
PASSING ANY FLATUS. REMAINS NPO AT THIS TIME UNTIL ABLE TO PASS FLATUS. DENIES
NAUSEA. BOWEL SOUNDS HYPOACTIVE.
1000 CHEST TUBE REMOVED BY MOIRA REBOLLEDO. CHEST XRAY DONE FOLLOWING.
REMAINED ON BEDREST 1HR AFTER REMOVAL.
1100 AMBULATED IN HALLWAY X2 AND TOLERATED WELL. PT'S WIFE AT BEDSIDE.

## 2021-01-16 VITALS — SYSTOLIC BLOOD PRESSURE: 134 MMHG | DIASTOLIC BLOOD PRESSURE: 78 MMHG

## 2021-01-16 VITALS — DIASTOLIC BLOOD PRESSURE: 78 MMHG | SYSTOLIC BLOOD PRESSURE: 122 MMHG

## 2021-01-16 VITALS — DIASTOLIC BLOOD PRESSURE: 68 MMHG | SYSTOLIC BLOOD PRESSURE: 116 MMHG

## 2021-01-16 VITALS — SYSTOLIC BLOOD PRESSURE: 141 MMHG | DIASTOLIC BLOOD PRESSURE: 87 MMHG

## 2021-01-16 VITALS — DIASTOLIC BLOOD PRESSURE: 71 MMHG | SYSTOLIC BLOOD PRESSURE: 109 MMHG

## 2021-01-16 VITALS — DIASTOLIC BLOOD PRESSURE: 78 MMHG | SYSTOLIC BLOOD PRESSURE: 128 MMHG

## 2021-01-16 VITALS — DIASTOLIC BLOOD PRESSURE: 74 MMHG | SYSTOLIC BLOOD PRESSURE: 116 MMHG

## 2021-01-16 VITALS — SYSTOLIC BLOOD PRESSURE: 126 MMHG | DIASTOLIC BLOOD PRESSURE: 82 MMHG

## 2021-01-16 VITALS — DIASTOLIC BLOOD PRESSURE: 77 MMHG | SYSTOLIC BLOOD PRESSURE: 134 MMHG

## 2021-01-16 VITALS — DIASTOLIC BLOOD PRESSURE: 79 MMHG | SYSTOLIC BLOOD PRESSURE: 128 MMHG

## 2021-01-16 VITALS — SYSTOLIC BLOOD PRESSURE: 119 MMHG | DIASTOLIC BLOOD PRESSURE: 83 MMHG

## 2021-01-16 VITALS — DIASTOLIC BLOOD PRESSURE: 71 MMHG | SYSTOLIC BLOOD PRESSURE: 107 MMHG

## 2021-01-16 VITALS — SYSTOLIC BLOOD PRESSURE: 115 MMHG | DIASTOLIC BLOOD PRESSURE: 78 MMHG

## 2021-01-16 LAB
ANION GAP SERPL CALC-SCNC: 9 MMOL/L (ref 7–16)
BUN SERPL-MCNC: 11 MG/DL (ref 7–18)
CALCIUM SERPL-MCNC: 8.5 MG/DL (ref 8.5–10.1)
CHLORIDE SERPL-SCNC: 103 MMOL/L (ref 98–107)
CO2 SERPL-SCNC: 28 MMOL/L (ref 21–32)
CREAT SERPL-MCNC: 0.8 MG/DL (ref 0.7–1.3)
GLUCOSE SERPL-MCNC: 83 MG/DL (ref 74–106)
POTASSIUM SERPL-SCNC: 3.8 MMOL/L (ref 3.5–5.1)
SODIUM SERPL-SCNC: 140 MMOL/L (ref 136–145)

## 2021-01-16 NOTE — O
[facility]
[address]
[place]   [address]                     OPERATIVE REPORT              
_______________________________________________________________________________
 
Name:       VANCE CHOW                 Room #:         251-P       ADM IN  
M.R.#:      [id number]                       Account #:      [id number]  
Admission:  01/13/21    Attend Phys:    Kyle Weems MD    
Discharge:              Date of Birth:  [birth year]  
                                                          Report #: [id number]
                                                                    [id number]   
_______________________________________________________________________________
THIS REPORT FOR:  
 
cc:  Sunshine Hammond MD, Margaret A. MD Forman,Kyle GRACIA MD                                            ~
 
DATE OF SERVICE:  01/13/2021
 
 
PREOPERATIVE DIAGNOSIS:  Chronic infiltrate left upper lobe of lung due to
bronchial obstruction.
 
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
bronchial obstruction.
 
OPERATIONS:  Bronchoscopy, left thoracotomy with upper lobectomy.
 
SURGEON:  Kyle Weems MD
 
ASSISTANT:  MOIRA Domingo
 
ANESTHESIA:  General.
 
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
chronic infiltrate of the left upper lobe of lung.  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  The patient has had
cultures done, and there is no evidence for fungal or AFB involvement.  No
evidence of carcinoma exists.
 
FINDINGS AND TECHNIQUE:  After general anesthesia was established, flexible
diagnostic bronchoscopy was performed.  There appeared to be some obstruction of
what appeared to be the superior segment of the lingular orifice with some
chronic changes in the airway and some flattening of airflow dividers but no
true endobronchial lesions.
 
A double lumen endotracheal tube was placed, and the patient was positioned with
the left side up.  Exposure was obtained through posterolateral thoracotomy
using a rib sparing, nerve sparing approach entering the fifth interspace. 
Unfortunately, this exposure appeared to be too low.  It was thought that
perhaps with the chronic decreased ventilation in the upper lobe that there had
been some shift in the chest wall relative to the hilum.
 
In any event, an incision was made, and a second intercostal flap was made above
the 5th rib, and the 5th rib was excised for exposure.
 
With our improvement exposure, we saw that the upper lobe was chronically
 
 
 
[facility]
[address]
[place]   [address]                     OPERATIVE REPORT              
_______________________________________________________________________________
 
Name:       VANCE CHOW                 Room #:         251-P       ADM IN  
M.R.#:      [id number]                       Account #:      [id number]  
Admission:  01/13/21    Attend Phys:    Kyle Weems MD    
Discharge:              Date of Birth:  [birth year]  
                                                          Report #: [id number]
                                                                    [id number]   
_______________________________________________________________________________
 
turgid.  The nearly complete fissure was exploited to expose the interlobar
pulmonary artery sequentially, pulmonary arterial and venous branches were
ligated and divided.  There was some difficulty with exposure due to the
uncompressible upper lobe.
 
After the pulmonary arterial and venous branches were ligated and divided with
the stapler, the bronchus was circum-dissected and stapled after testing
ventilation.  The bronchus was divided, and the lobe was submitted for
pathology.
 
The stump was tested and found to be secured.  Pulmonary ligament was divided. 
Chest was irrigated, and hemostasis was ascertained.  Wound was closed in
layers, maintaining the nerve sparing approach by drilling through the 6th rib
and placing pericostal sutures to preserve the intercostal muscle flaps.
 
A chest tube had been put through a separate stab wound prior to closure.  The
patient tolerated the procedure well and was taken to the recovery area in good
condition.  All counts reported as correct.
 
 
 
 
 
 
 
 
 
 
 
 
 
 
 
 
 
 
 
 
 
 
 
 
 
  <ELECTRONICALLY SIGNED>
   By: Kyle Weems MD            
  01/16/21     1121
D: 01/13/21 1400                           _____________________________________
T: 01/13/21 1421                           Kyle Weems MD              /nt

## 2021-01-16 NOTE — NUR
Assumed care at 0700, assessment and vital signs completed per ICU protocol.
RN will continue to monitor.

## 2021-01-17 VITALS — SYSTOLIC BLOOD PRESSURE: 116 MMHG | DIASTOLIC BLOOD PRESSURE: 79 MMHG

## 2021-01-17 VITALS — SYSTOLIC BLOOD PRESSURE: 110 MMHG | DIASTOLIC BLOOD PRESSURE: 73 MMHG

## 2021-01-17 VITALS — DIASTOLIC BLOOD PRESSURE: 69 MMHG | SYSTOLIC BLOOD PRESSURE: 116 MMHG

## 2021-01-17 VITALS — SYSTOLIC BLOOD PRESSURE: 123 MMHG | DIASTOLIC BLOOD PRESSURE: 83 MMHG

## 2021-01-17 VITALS — DIASTOLIC BLOOD PRESSURE: 82 MMHG | SYSTOLIC BLOOD PRESSURE: 115 MMHG

## 2021-01-17 VITALS — DIASTOLIC BLOOD PRESSURE: 84 MMHG | SYSTOLIC BLOOD PRESSURE: 117 MMHG

## 2021-01-17 VITALS — SYSTOLIC BLOOD PRESSURE: 117 MMHG | DIASTOLIC BLOOD PRESSURE: 72 MMHG

## 2021-01-17 VITALS — DIASTOLIC BLOOD PRESSURE: 76 MMHG | SYSTOLIC BLOOD PRESSURE: 110 MMHG

## 2021-01-17 VITALS — SYSTOLIC BLOOD PRESSURE: 135 MMHG | DIASTOLIC BLOOD PRESSURE: 81 MMHG

## 2021-01-17 VITALS — SYSTOLIC BLOOD PRESSURE: 125 MMHG | DIASTOLIC BLOOD PRESSURE: 91 MMHG

## 2021-01-17 NOTE — NUR
PT MOTIVATED, PULLING 1,500 ON INCENTIVE SPIROMETER, UP IN CHAIR SINCE
BREAKFAST, AMBULATING IN CARDOZO WITH ONE ASSIST, SPLINTING L LATERAL CHEST WITH
ANY ACTIVITY. WIFE IN ROOM PROVIDING SUPPORT. PROGRESSING.

## 2021-01-17 NOTE — NUR
report previously given to DANIELLE Hopkins. pt transferred to ccu #[id number], ambulated
with gait belt on and one RN assist.  wife present.

## 2021-01-17 NOTE — NUR
PT ARRIVED TO UNIT FROM ICU APPROX 1400. PT ALERT AND ORIENTED.VSS. O2 SATS
WNL ROOM AIR. UP AD ROSALBA TOLERATING WELL. DENIES SOB. DRESSING CDI. SPOUSE AT
BEDSIDE. PT HAD ASYMPTOMATIC RUN VTACH- CHARTED. MONITORING RHYTHM. PT
PROGRESSING VERY WELL. TYLENOL GIVEN X1. CONT WITH POC. WILL PASS ON REPORT TO
WILL SANTOS.

## 2021-01-17 NOTE — NUR
Patient tried to lay on his ight side, but desats to 80-82%, takes abou 5 min
to recover to 92% when moved back to semi fowlers. When asleep patient snores,
and sleeps with mouth open, has to aroused to take a breath r/t o2 sat 85-88%,
recovered within 1 min to 92% o2 sat.
Patient denied wanting to take any pain medication other than Tylenol, stated
Tramadol made him feel unwell, dizzy, sweaty, and uneasy in his stomach.
Pregressing toward goals.

## 2021-01-18 VITALS — DIASTOLIC BLOOD PRESSURE: 97 MMHG | SYSTOLIC BLOOD PRESSURE: 137 MMHG

## 2021-01-18 VITALS — DIASTOLIC BLOOD PRESSURE: 83 MMHG | SYSTOLIC BLOOD PRESSURE: 135 MMHG

## 2021-01-18 VITALS — SYSTOLIC BLOOD PRESSURE: 137 MMHG | DIASTOLIC BLOOD PRESSURE: 44 MMHG

## 2021-01-18 NOTE — NUR
ASSUMED CARE PT SHIFT CHANGE. ASSESSMENT AS CHARTED MEDS GIVEN PER MAR. PT
ALERT AND ORIENTED.VSS.UPAD ROSALBA DELVIS WELL. DRSNGS REMAIN DRY INTACT .DENIES
SOB. O2 SATS WNL ROOM AIR. DC ACKNOWLEDGED AND IMPLEMENTED. DISCUSSED DC
PAPERWORK WITH PT AND SPOUSE COMMUNICATES UNDERSTANDING. IV REMOVED TELE
REMOVED. LEFT UNIT WITH ALL BELONGINGS.

## 2021-01-18 NOTE — NUR
ASSUME CARE 1900. PT/VITALS STABLE. DENIES ANY PAIN. TOLERATES ACTIVITY WELL.
ASSESSMETN ASA CHARTED. PROGRESSING WELL WITH POC. NO DISTRESS NOTED THEOUGH
THE SIGHT/ SR ON MNITOR. ADEQUATE REST. PLAN IS POSSIBLE DISCHARGE WITHIN THE
NEXT 2 DAYS. WILL CONTINUE TO MONITOR AND FOLLOW WITH POC

## 2021-01-18 NOTE — PATH
[facility]
[address]
[place]   [address]                     PATHOLOGY RPT PROCEDURE       
_______________________________________________________________________________
 
Name:       VANCE CHOW                 Room #:         201-P       DIS IN  
M.R.#:      [id number]     Account #:      [id number]  
Admission:  01/13/21    Date of Birth:  [birth year]  
Discharge:  01/18/21                                    Report #:    [id number]
                                                        Path Case #: [id number]
_______________________________________________________________________________
 
L[facility] Accession Number: [id number]
.                                                                01
Material submitted:                                        .
lung - LEFT UPPER LOBE LUNG. Modifiers: left, upper
.                                                                01
Clinician provided ICD-10:
Q32.4
.                                                                01
Clinical history:                                          .
THORACOTMY
BROCHOSCOPY
LOBECTOMY LUNG
BRONCHIAL ATRESIA
.                                                                02
**********************************************************************
Diagnosis:
Lung, left upper lobe lung, lobectomy:
- INVASIVE MODERATELY-DIFFERENTIATED ADENOCARCINOMA, ACINAR SUBTYPE (UP TO
90%) MEASURING 9.0 CM IN GREATEST DIMENSION.
- FOCAL VISCERAL PLEURAL INVASION IDENTIFIED.
- Bronchial margin negative for dysplasia or malignancy.
- Vascular margin negative for malignancy.
- ONE LYMPH NODE WITH FOCAL METASTATIC ADENOCARCINOMA (1/8).
.
(IUV:mml; 01/15/2021)
.
.
Surgical Pathology Cancer Case Summary
.
Protocol Posting Date: February 2020
.
LUNG:
Procedure-  Lobectomy
Specimen Laterality- Left
Tumor Site- Upper lobe of lung
Tumor Size-  9.0 cm in greatest dimensions
Tumor Focality- Single tumor
Histologic Type-  Invasive adenocarcinoma, acinar predominant
Other subtypes present: lepidic (10 percent)
Histologic Grade- G2: Moderately differentiated
Spread Through Air Spaces- Identified
Visceral Pleura Invasion- Identified
Lymphovascular Invasion- Identified
Direct Invasion of Adjacent Structures- No adjacent structures present
Margins examined: Bronchial, Vascular and Visceral Pleura
Margin involved- Visceral pleural margin
Distance of invasive carcinoma from closest Bronchovascular margin
 
 
[facility]
[address]
[place]   [address]                     PATHOLOGY RPT PROCEDURE       
_______________________________________________________________________________
 
Name:       VANCE CHOW                 Room #:         201-P       [facility] [place]  
[facility].#:      [id number]     Account #:      [id number]  
Admission:  01/13/21    Date of Birth:  [birth year]  
Discharge:  01/18/21                                    Report #:    [id number]
                                                        Path Case #: [id number]
_______________________________________________________________________________
(centimeters):  1.0 cm
Treatment Effect- No known presurgical therapy
Regional Lymph Nodes-
Number of Lymph Nodes Examined: 8
Specify marisol station(s) examined:  Hilar Lymph nodes.
.
.
Pathologic Stage Classification (pTNM, AJCC 8th Edition)
.
TNM Descriptors- None
Primary Tumor (pT) pT4:        Tumor >7 cm in greatest dimension; or tumor
of any size invading one or more of the following: diaphragm, mediastinum,
heart, great vessels, trachea, recurrent laryngeal nerve, esophagus,
vertebral body or anne; or separate tumor nodule(s) in an ipsilateral
lobe different from that of the primary.
.
Regional Lymph Nodes (pN) pN1: Metastasis in ipsilateral peribronchial or
ipsilateral hilar lymph nodes and intrapulmonary nodes including by direct
extension.
.
Distant Metastasis (pM): pMx (unknown)
QLM  01/18/2021  1130 Local
**********************************************************************
.                                                                02
Comment:
Examination of the sections submitted as "indurated parenchyma", "cystic
mucinous parenchyma" and "fleshy parenchyma" show an adenocarcinoma
predominantly of acinar subtype.  Focal lepidic pattern is identified with
spread through the alveolar spaces.  There is no malignancy identified
within the section submitted as "unremarkable parenchyma".
.
Properly-controlled immunohistochemical stain and a special stain are
performed on block A3:
.
VVG special stain:  Shows focal loss of elastic lamina consistent with
disruption of the visceral pleura by the tumor.
.
TTF-1:  Shows strong reactivity within the tumor and presence of tumor
with focal disruption of the visceral pleura.
.
Co-review:  Representative slides (A4 and A8) were co-reviewed by Dr. Heavenly Dover who concurs with my diagnosis.
.
The findings of this case are discussed with Dr. Kyle Weems at
approximately 3:00 p.m. on 1/15/2021 and with Dr. Angeles in the morning of
1/18/21.
.
(IUV:mml; 01/15/2021)
 
 
[address]   [address]                     PATHOLOGY RPT PROCEDURE       
_______________________________________________________________________________
 
Name:       VANCE CHOW                 Room #:         201-P       DIS IN  
M.R.#:      [id number]     Account #:      [id number]  
Admission:  01/13/21    Date of Birth:  [birth year]  
Discharge:  01/18/21                                    Report #:    [id number]
                                                        Path Case #: [id number]
_______________________________________________________________________________
.                                                                02
Electronically signed:                                     .
Mary Lizama MD, Pathologist
NPI- [id number]
.                                                                01
Gross description:                                         .
The specimen is received in formalin, labeled "Vance Chow, left upper
lobe lung" and consists of a 332 g lobectomy specimen measuring 14.8 x
11.5 x 4.8 cm.  The pleural surface is purple gray to tan with 2 areas of
disruption.  There is an approximately 9.0 x 5.0 cm aspect that is
markedly indurated with focal puckering (inked black).  The bronchial
stump measures 0.4 x 2.3 cm.  The bronchi are opened revealing smooth
rubbery linings.  The parenchyma is soft spongy to fleshy pink-tan with
the previously described indurated aspect showing mucinous, cystic, and
friable to lobulated tan cut surfaces.  This aspect abuts the inked
pleural surface, 1.0 cm to the bronchial stump, and abuts the bronchi.
Representative sections are submitted as follows:
.
A1: Possible lymph nodes adjacent to the bronchial stump
A2: Bronchial stump margin
A3-A4: Indurated parenchyma
A5-A7: Cystic mucinous parenchyma
A8: "Fleshy" parenchyma
A9: Grossly unremarkable parenchyma
(SDY; 1/14/2021)
.
After initial microscopic examination, additional sections are submitted
as follows:
.
A10: 1 hilar lymph node, serially sectioned
A11: 1 trisected hilar lymph node
A12: 1 bisected hilar lymph node
A13: 1 intact lymph node candidates
(SDY; 1/15/2021)
SYU/SYU  01/18/2021  1130 Local
.                                                                02
Pathologist provided ICD-10:
C34.12
.                                                                02
CPT                                                        .
821627, [id number], [id number]
Specimen Comment: A courtesy copy of this report has been sent to [phone],
[phone]
Specimen Comment: [id number]
Specimen Comment: Report sent to ,DR TODD / DR WATKINS
***Performed at:  01
   [address], [place], [place]  [id number]
 
 
[facility]
[address]
[place]   [address]                     PATHOLOGY RPT PROCEDURE       
_______________________________________________________________________________
 
Name:       VANCE CHOW                 Room #:         201-P       DIS IN  
M.R.#:      [id number]     Account #:      [id number]  
Admission:  01/13/21    Date of Birth:  [birth year]  
Discharge:  01/18/21                                    Report #:    [id number]
                                                        Path Case #: [id number]
_______________________________________________________________________________
   MD Norman Bass MD Phone:  [phone]
***Performed at:  02
   [facility]
   [address], [place]  [id number]
   MD Mary Lizama MD Phone:  [phone]

## 2021-01-22 ENCOUNTER — HOSPITAL ENCOUNTER (OUTPATIENT)
Dept: HOSPITAL 61 - KCIC | Age: 56
End: 2021-01-22
Attending: INTERNAL MEDICINE
Payer: COMMERCIAL

## 2021-01-22 DIAGNOSIS — Z90.2: ICD-10-CM

## 2021-01-22 DIAGNOSIS — C34.90: Primary | ICD-10-CM

## 2021-01-22 PROCEDURE — 71046 X-RAY EXAM CHEST 2 VIEWS: CPT

## 2021-01-22 NOTE — KCIC
Chest, PA and Lateral:



Technique:  PA and lateral views of the chest were obtained.



History:  Thoracotomy.



Comparison: 1/30/2020.





Findings/

impression:





 The heart and pulmonary vasculature appear within normal limits. Linear bibasilar lung airspace opac
ities likely atelectasis or infiltrates.. Left thoracotomy changes..



Electronically signed by: Zander Vieira MD (1/22/2021 5:07 PM) [id number]

## 2021-02-04 ENCOUNTER — HOSPITAL ENCOUNTER (OUTPATIENT)
Dept: HOSPITAL 61 - ONCLAB | Age: 56
End: 2021-02-04
Attending: PHYSICIAN ASSISTANT
Payer: COMMERCIAL

## 2021-02-04 DIAGNOSIS — C34.12: ICD-10-CM

## 2021-02-04 DIAGNOSIS — Z12.5: Primary | ICD-10-CM

## 2021-02-04 LAB
% LYMPHS: 18 % (ref 24–48)
% MONOS: 9 % (ref 0–10)
% SEGS: 57 % (ref 35–66)
ALBUMIN SERPL-MCNC: 3.4 G/DL (ref 3.4–5)
ALBUMIN/GLOB SERPL: 0.9 {RATIO} (ref 1–1.7)
ALP SERPL-CCNC: 114 U/L (ref 46–116)
ALT SERPL-CCNC: 86 U/L (ref 16–63)
ANION GAP SERPL CALC-SCNC: 6 MMOL/L (ref 6–14)
AST SERPL-CCNC: 33 U/L (ref 15–37)
BASOPHILS # BLD AUTO: 0.1 X10^3/UL (ref 0–0.2)
BASOPHILS NFR BLD AUTO: 1 % (ref 0–3)
BASOPHILS NFR BLD: 1 % (ref 0–3)
BILIRUB SERPL-MCNC: 0.2 MG/DL (ref 0.2–1)
BUN SERPL-MCNC: 21 MG/DL (ref 8–26)
BUN/CREAT SERPL: 16 (ref 6–20)
CALCIUM SERPL-MCNC: 8.7 MG/DL (ref 8.5–10.1)
CHLORIDE SERPL-SCNC: 106 MMOL/L (ref 98–107)
CO2 SERPL-SCNC: 27 MMOL/L (ref 21–32)
CREAT SERPL-MCNC: 1.3 MG/DL (ref 0.7–1.3)
EOSINOPHIL NFR BLD AUTO: 15 % (ref 0–5)
EOSINOPHIL NFR BLD: 1.4 X10^3/UL (ref 0–0.7)
EOSINOPHIL NFR BLD: 17 % (ref 0–3)
ERYTHROCYTE [DISTWIDTH] IN BLOOD BY AUTOMATED COUNT: 14.9 % (ref 11.5–14.5)
GFR SERPLBLD BASED ON 1.73 SQ M-ARVRAT: 57.3 ML/MIN
GLUCOSE SERPL-MCNC: 70 MG/DL (ref 70–99)
HCT VFR BLD CALC: 41.6 % (ref 39–53)
HGB BLD-MCNC: 13.8 G/DL (ref 13–17.5)
LDH SERPL L TO P-CCNC: 217 U/L (ref 85–227)
LYMPHOCYTES # BLD: 1.1 X10^3/UL (ref 1–4.8)
LYMPHOCYTES NFR BLD AUTO: 13 % (ref 24–48)
MCH RBC QN AUTO: 30 PG (ref 25–35)
MCHC RBC AUTO-ENTMCNC: 33 G/DL (ref 31–37)
MCV RBC AUTO: 90 FL (ref 79–100)
MONO #: 0.9 X10^3/UL (ref 0–1.1)
MONOCYTES NFR BLD: 10 % (ref 0–9)
NEUT #: 4.9 X10^3/UL (ref 1.8–7.7)
NEUTROPHILS NFR BLD AUTO: 58 % (ref 31–73)
PLATELET # BLD AUTO: 322 X10^3/UL (ref 140–400)
PLATELET # BLD EST: ADEQUATE 10*3/UL
POTASSIUM SERPL-SCNC: 4.4 MMOL/L (ref 3.5–5.1)
PROT SERPL-MCNC: 7 G/DL (ref 6.4–8.2)
RBC # BLD AUTO: 4.62 X10^6/UL (ref 4.3–5.7)
SODIUM SERPL-SCNC: 139 MMOL/L (ref 136–145)
WBC # BLD AUTO: 8.4 X10^3/UL (ref 4–11)

## 2021-02-04 PROCEDURE — 36415 COLL VENOUS BLD VENIPUNCTURE: CPT

## 2021-02-04 PROCEDURE — 85007 BL SMEAR W/DIFF WBC COUNT: CPT

## 2021-02-04 PROCEDURE — 85025 COMPLETE CBC W/AUTO DIFF WBC: CPT

## 2021-02-04 PROCEDURE — 83615 LACTATE (LD) (LDH) ENZYME: CPT

## 2021-02-04 PROCEDURE — G0103 PSA SCREENING: HCPCS

## 2021-02-04 PROCEDURE — 80053 COMPREHEN METABOLIC PANEL: CPT

## 2021-02-09 ENCOUNTER — HOSPITAL ENCOUNTER (OUTPATIENT)
Dept: HOSPITAL 61 - INTRAD | Age: 56
Discharge: HOME | End: 2021-02-09
Attending: PHYSICIAN ASSISTANT
Payer: COMMERCIAL

## 2021-02-09 VITALS — DIASTOLIC BLOOD PRESSURE: 82 MMHG | SYSTOLIC BLOOD PRESSURE: 133 MMHG

## 2021-02-09 VITALS
DIASTOLIC BLOOD PRESSURE: 70 MMHG | DIASTOLIC BLOOD PRESSURE: 70 MMHG | SYSTOLIC BLOOD PRESSURE: 108 MMHG | SYSTOLIC BLOOD PRESSURE: 108 MMHG | SYSTOLIC BLOOD PRESSURE: 108 MMHG | DIASTOLIC BLOOD PRESSURE: 70 MMHG | SYSTOLIC BLOOD PRESSURE: 108 MMHG | DIASTOLIC BLOOD PRESSURE: 70 MMHG | DIASTOLIC BLOOD PRESSURE: 70 MMHG | SYSTOLIC BLOOD PRESSURE: 108 MMHG | SYSTOLIC BLOOD PRESSURE: 108 MMHG | DIASTOLIC BLOOD PRESSURE: 70 MMHG | SYSTOLIC BLOOD PRESSURE: 108 MMHG | SYSTOLIC BLOOD PRESSURE: 108 MMHG | DIASTOLIC BLOOD PRESSURE: 70 MMHG | DIASTOLIC BLOOD PRESSURE: 70 MMHG | SYSTOLIC BLOOD PRESSURE: 108 MMHG | DIASTOLIC BLOOD PRESSURE: 70 MMHG | DIASTOLIC BLOOD PRESSURE: 70 MMHG | DIASTOLIC BLOOD PRESSURE: 70 MMHG | SYSTOLIC BLOOD PRESSURE: 108 MMHG | DIASTOLIC BLOOD PRESSURE: 70 MMHG | SYSTOLIC BLOOD PRESSURE: 108 MMHG | SYSTOLIC BLOOD PRESSURE: 108 MMHG | SYSTOLIC BLOOD PRESSURE: 108 MMHG | DIASTOLIC BLOOD PRESSURE: 70 MMHG

## 2021-02-09 VITALS — SYSTOLIC BLOOD PRESSURE: 113 MMHG | DIASTOLIC BLOOD PRESSURE: 71 MMHG

## 2021-02-09 VITALS — SYSTOLIC BLOOD PRESSURE: 138 MMHG | DIASTOLIC BLOOD PRESSURE: 82 MMHG

## 2021-02-09 VITALS — HEIGHT: 73 IN | WEIGHT: 210 LBS | BODY MASS INDEX: 27.83 KG/M2

## 2021-02-09 VITALS — DIASTOLIC BLOOD PRESSURE: 76 MMHG | SYSTOLIC BLOOD PRESSURE: 119 MMHG

## 2021-02-09 VITALS — SYSTOLIC BLOOD PRESSURE: 132 MMHG | DIASTOLIC BLOOD PRESSURE: 81 MMHG

## 2021-02-09 DIAGNOSIS — K21.9: ICD-10-CM

## 2021-02-09 DIAGNOSIS — E78.00: ICD-10-CM

## 2021-02-09 DIAGNOSIS — Z98.890: ICD-10-CM

## 2021-02-09 DIAGNOSIS — I10: ICD-10-CM

## 2021-02-09 DIAGNOSIS — Z79.899: ICD-10-CM

## 2021-02-09 DIAGNOSIS — C34.90: ICD-10-CM

## 2021-02-09 DIAGNOSIS — Z45.2: Primary | ICD-10-CM

## 2021-02-09 DIAGNOSIS — J45.909: ICD-10-CM

## 2021-02-09 LAB — PROTHROMBIN TIME: 12.5 SEC (ref 11.7–14)

## 2021-02-09 PROCEDURE — 99152 MOD SED SAME PHYS/QHP 5/>YRS: CPT

## 2021-02-09 PROCEDURE — C9803 HOPD COVID-19 SPEC COLLECT: HCPCS

## 2021-02-09 PROCEDURE — 99153 MOD SED SAME PHYS/QHP EA: CPT

## 2021-02-09 PROCEDURE — 36415 COLL VENOUS BLD VENIPUNCTURE: CPT

## 2021-02-09 PROCEDURE — C1894 INTRO/SHEATH, NON-LASER: HCPCS

## 2021-02-09 PROCEDURE — 87426 SARSCOV CORONAVIRUS AG IA: CPT

## 2021-02-09 PROCEDURE — C1751 CATH, INF, PER/CENT/MIDLINE: HCPCS

## 2021-02-09 PROCEDURE — 85610 PROTHROMBIN TIME: CPT

## 2021-02-09 PROCEDURE — 76937 US GUIDE VASCULAR ACCESS: CPT

## 2021-02-09 PROCEDURE — C1892 INTRO/SHEATH,FIXED,PEEL-AWAY: HCPCS

## 2021-02-09 PROCEDURE — 77001 FLUOROGUIDE FOR VEIN DEVICE: CPT

## 2021-02-09 PROCEDURE — 36561 INSERT TUNNELED CV CATH: CPT

## 2021-02-09 PROCEDURE — U0003 INFECTIOUS AGENT DETECTION BY NUCLEIC ACID (DNA OR RNA); SEVERE ACUTE RESPIRATORY SYNDROME CORONAVIRUS 2 (SARS-COV-2) (CORONAVIRUS DISEASE [COVID-19]), AMPLIFIED PROBE TECHNIQUE, MAKING USE OF HIGH THROUGHPUT TECHNOLOGIES AS DESCRIBED BY CMS-2020-01-R: HCPCS

## 2021-02-09 NOTE — NUR
PIV removed, instructions on site care, port given to patient. Patient and wife verbalized 
understanding. No bleeding at port site. VS stable. 

All belongings taken with patient at time of d/c.

## 2021-02-09 NOTE — RAD
Procedure: Ultrasound and fluoroscopically guided placement of right internal

jugular power port..  2/9/2021 8:13 AM



Clinical Indication:  LUNG ADENOCARCINOMA



Sedation: Conscious sedation was administered for 30 minutes.  The patient was

monitored by a qualified independent observer throughout the time of sedation.

 Please refer to the medical record for exact doses of medications utilized to

achieve moderate sedation.



Fluoroscopy time:  0.5 minutes

Dose area product:  Gycm2



Consent: The procedure was explained in its entirety to the patient or the

patients designated representative by a member of the treatment team,

including a discussion of the risks, benefits and commonly accepted

alternatives to the procedure, as well as the expected consequences of no

therapy whatsoever.   Discussion of the risks included, but was not limited

to, those that are most frequent and those that are rare but possibly severe

or life-threatening, as well as the possibility of unforeseen complications.

 



Technique and Findings:  



 All elements of maximal sterile barrier technique including the use of a cap,

mask, sterile gown, sterile gloves, large sterile sheet, appropriate hand

hygiene, and 2% chlorhexidine for cutaneous antisepsis (or acceptable

alternative antiseptic per current guidelines) were followed for this

procedure. Following informed consent, and a timeout procedure, the patient

was prepped and draped in the usual sterile fashion.  



Ultrasound interrogation of the right neck revealed patency and

compressibility of the right internal jugular vein.  A 21-gauge micropuncture

was then used to gain access to this vein under ultrasound guidance.  A hard

copy ultrasound image was recorded.  The needle was exchanged over a wire for

a sheath.



 A 1 inch incision was made several centimeters inferior to the venotomy site.

A catheter was tunneled from this site dermatotomy site in the neck. Catheter

was advanced through peel-away sheath such that its tip was in the proximal

right atrium with the patient supine. The catheter was trimmed to length and

connected to the port reservoir. The port was found to flush and aspirate

normally. The wound was closed in layers using 4-0 Vicryl suture. Sterile

dressings were applied.





Impression: Successful ultrasound and fluoroscopically guided placement of a

right internal jugular PowerPort

## 2021-02-16 ENCOUNTER — HOSPITAL ENCOUNTER (OUTPATIENT)
Dept: HOSPITAL 61 - KCIC MRI | Age: 56
End: 2021-02-16
Attending: INTERNAL MEDICINE
Payer: COMMERCIAL

## 2021-02-16 DIAGNOSIS — J98.11: ICD-10-CM

## 2021-02-16 DIAGNOSIS — C34.12: Primary | ICD-10-CM

## 2021-02-16 DIAGNOSIS — R91.8: ICD-10-CM

## 2021-02-16 PROCEDURE — 74177 CT ABD & PELVIS W/CONTRAST: CPT

## 2021-02-16 PROCEDURE — 70553 MRI BRAIN STEM W/O & W/DYE: CPT

## 2021-02-16 PROCEDURE — 71260 CT THORAX DX C+: CPT

## 2021-02-16 PROCEDURE — 82565 ASSAY OF CREATININE: CPT

## 2021-02-16 NOTE — KCIC
EXAMINATION: Magnetic resonance imaging (MRI) of the brain and brainstem without and with contrast 2/
16/2021 8:50 AM



HISTORY: Lung adenocarcinoma. New cancer diagnosis January 2021



TECHNIQUE: Multiplanar multi-weighted MRI of the brain and brainstem was performed without and with i
ntravenous contrast using the general brain protocol.



Contrast information:

18 mL Gadolinium based contrast



COMPARISON: None available.



FINDINGS:



The scalp and calvarium are normal. The superior sagittal sinus demonstrates normal venous flow.  The
 corpus callosum is normal in shape and signal intensity. The posterior fossa is unremarkable.  The p
ituitary and sella are normal.  The brainstem and craniocervical junction are unremarkable.





Diffusion weighted images reveal no hyperintensities to suggest acute cerebral infarction. The suscep
tibility weighted sequences reveal no evidence of acute or chronic hemorrhage. The ventricles are nor
mal in size and position without evidence of hydrocephalus.



There are no areas of abnormal contrast enhancement.



Small mucus retention cyst identified in the paranasal sinuses.  The visualized portions of the masto
ids are unremarkable. The orbits appear normal.  Normal flow voids are demonstrated in the carotid ar
teries and basilar artery.



IMPRESSION:



No evidence for intracranial metastatic disease.



No evidence for acute or subacute ischemia.



Electronically signed by: Bekah Lozoya MD (2/16/2021 11:29 AM) [id number]

## 2021-02-16 NOTE — KCIC
CT STUDY OF THE CHEST AND ABDOMEN AND PELVIS WITH CONTRAST



Clinical indications: Lung adenocarcinoma. Surgery on January 13, 2021 with a left-sided thoracotomy.




TECHNIQUE: After IV infusion of 100 cc of Omnipaque 300, helical CT scanning of the chest and abdomen
 and pelvis was performed. GI contrast was administered per mouth.







PQRS compliance Statement



One or more of the following individualized dose reduction techniques were utilized for this study:

1.  Automated exposure control

2.  Adjustment of the mA and/or kV according to patient size

3.  Use of iterative reconstruction technique



COMPARISON: Chest CT dated November 25, 2020.



CHEST CT: No enlarged thoracic lymphadenopathy is evident. Heart size normal and no pericardial effus
ion is evident. Mild calcified atheromatous disease the coronary arteries. No focal aneurysmal dilata
tion or dissection of the thoracic aorta is seen. Small left-sided pleural effusion is now evident. T
he previously seen lung consolidation of the left upper lobe is not evident now after partial pneumon
ectomy which resection of the left upper lobe. The left lower lobe is hyperexpanded. No lung consolid
ation is seen within the left lower lobe. There is focal pleural thickening of the lateral aspect of 
the left midlung zone. On series 8 and image 37, there are 2 small lateral lung nodules each measurin
g 4 mm in size. Similar lung nodules are present within the left lower lobe more inferiorly on the pr
evious study and have not changed in size. On the right side, small groundglass lung infiltrate is se
en within the posterior aspect of the right apex which has not changed significantly. More inferiorly
 within the posterior right upper lobe, small groundglass lung nodule is seen which has not changed s
ignificantly. Within the right middle lobe, a small lateral consolidative infiltrate is seen along wi
th small lung nodules which are unchanged. Additional ill-defined groundglass lung infiltrates are se
en within the inferior aspect of the right middle lobe which are stable. There is linear atelectasis 
of the inferior aspect of the right middle lobe which is stable. There is a new finding of linear ate
lectasis within the anterior aspect of the right lower lobe. Small subpleural nodular areas of depend
ent atelectasis are seen within the right lower lobe. Postoperative changes of the left rib cage are 
seen. No lytic process is seen.



IMPRESSION: In the interim, the patient has had a partial pneumonectomy with surgical resection of th
e left upper lobe including the previously seen consolidative lung infiltrate. There are 2 remaining 
small lung nodules within the lateral aspect of the hyperexpanded left lower lobe which have not monsalve
ged significantly. There is a new small left-sided pleural effusion most likely postoperative in natu
re.



Stable infiltrates and nodules of the right lung field as discussed above. New linear atelectasis of 
the right lower lobe.



No enlarging thoracic lymphadenopathy.



ABDOMEN AND PELVIS CT: The liver and spleen and pancreas and gallbladder are normal. No extra hepatic
 biliary ductal dilatation is seen. No adrenal mass is evident. Small cyst of the lateral aspect of t
he left kidney is seen which is an incidental finding and no further follow-up is needed. Otherwise b
oth kidneys are normal without hydronephrosis or hydroureter or urinary tract stone. Urinary bladder 
wall is smooth. No focal aneurysmal dilatation of the abdominal aorta is seen. No enlarged abdominal 
or pelvic lymphadenopathy is evident. No obstructive bowel pattern is seen. No free fluid or free air
 is seen. There is mild increased density of the upper mesenteric root which was seen on the previous
 chest CT. No other focus of mesenteric edema is seen. No bowel wall thickening is seen. No lytic pro
cess is seen.



IMPRESSION: No significant abnormality.



Electronically signed by: Stewart Miranda MD (2/16/2021 3:59 PM) [id number]

## 2021-02-18 ENCOUNTER — HOSPITAL ENCOUNTER (OUTPATIENT)
Dept: HOSPITAL 61 - NM | Age: 56
End: 2021-02-18
Attending: INTERNAL MEDICINE
Payer: COMMERCIAL

## 2021-02-18 DIAGNOSIS — C34.12: Primary | ICD-10-CM

## 2021-02-18 PROCEDURE — A9503 TC99M MEDRONATE: HCPCS

## 2021-02-18 PROCEDURE — 78306 BONE IMAGING WHOLE BODY: CPT

## 2021-02-18 NOTE — RAD
Nuclear medicine whole body bone scan 



History: Reason: LUNG ADENOCARCINOMA / Spl. Instructions:  / History: 



Comparison:  CT chest abdomen and pelvis with contrast February 16, 2021.



Technique: Examination performed after intravenous administration of 27 mCi Technetium 99m MDP.  Imag
es of the whole body were obtained in the anterior and posterior projections.



Findings:  

There is mild tracer uptake of the left posterior lateral fifth rib. There is intense tracer uptake a
t the resection margin of the left posterior sixth rib. There is intense tracer uptake of the fractur
e of the left posterior seventh rib and mild uptake more laterally corresponding to postsurgical luce
ncies on CT.



Tracer uptake in the spine is relatively homogeneous. Tracer uptake in the pelvis is symmetric. Trace
r uptake of the extremities is unremarkable. Bilateral acromioclavicular and sternoclavicular and mil
d glenohumeral tracer uptake is likely degenerative.



Tracer distribution in the soft tissues appears physiologic.



Impression:  

1.  There is tracer uptake of left posterior ribs with corresponding postsurgical changes on CT.

2.  There is no scintigraphic evidence of bone metastasis.



Electronically signed by: Lon Tubbs MD (2/18/2021 5:17 PM) [facility]

## 2021-03-01 ENCOUNTER — HOSPITAL ENCOUNTER (OUTPATIENT)
Dept: HOSPITAL 61 - ONCLAB | Age: 56
End: 2021-03-01
Attending: INTERNAL MEDICINE
Payer: COMMERCIAL

## 2021-03-01 DIAGNOSIS — C34.12: Primary | ICD-10-CM

## 2021-03-01 LAB
ALBUMIN SERPL-MCNC: 3.5 G/DL (ref 3.4–5)
ALBUMIN/GLOB SERPL: 1.1 {RATIO} (ref 1–1.7)
ALP SERPL-CCNC: 106 U/L (ref 46–116)
ALT SERPL-CCNC: 55 U/L (ref 16–63)
ANION GAP SERPL CALC-SCNC: 7 MMOL/L (ref 6–14)
AST SERPL-CCNC: 25 U/L (ref 15–37)
BASOPHILS # BLD AUTO: 0 X10^3/UL (ref 0–0.2)
BASOPHILS NFR BLD: 1 % (ref 0–3)
BILIRUB SERPL-MCNC: 0.3 MG/DL (ref 0.2–1)
BUN SERPL-MCNC: 16 MG/DL (ref 8–26)
BUN/CREAT SERPL: 13 (ref 6–20)
CALCIUM SERPL-MCNC: 8.5 MG/DL (ref 8.5–10.1)
CHLORIDE SERPL-SCNC: 106 MMOL/L (ref 98–107)
CO2 SERPL-SCNC: 27 MMOL/L (ref 21–32)
CREAT SERPL-MCNC: 1.2 MG/DL (ref 0.7–1.3)
EOSINOPHIL NFR BLD: 0.6 X10^3/UL (ref 0–0.7)
EOSINOPHIL NFR BLD: 10 % (ref 0–3)
ERYTHROCYTE [DISTWIDTH] IN BLOOD BY AUTOMATED COUNT: 15.4 % (ref 11.5–14.5)
GFR SERPLBLD BASED ON 1.73 SQ M-ARVRAT: 62.9 ML/MIN
GLUCOSE SERPL-MCNC: 122 MG/DL (ref 70–99)
HCT VFR BLD CALC: 42.5 % (ref 39–53)
HGB BLD-MCNC: 13.9 G/DL (ref 13–17.5)
LYMPHOCYTES # BLD: 0.9 X10^3/UL (ref 1–4.8)
LYMPHOCYTES NFR BLD AUTO: 16 % (ref 24–48)
MCH RBC QN AUTO: 30 PG (ref 25–35)
MCHC RBC AUTO-ENTMCNC: 33 G/DL (ref 31–37)
MCV RBC AUTO: 90 FL (ref 79–100)
MONO #: 0.7 X10^3/UL (ref 0–1.1)
MONOCYTES NFR BLD: 12 % (ref 0–9)
NEUT #: 3.4 X10^3/UL (ref 1.8–7.7)
NEUTROPHILS NFR BLD AUTO: 61 % (ref 31–73)
PLATELET # BLD AUTO: 237 X10^3/UL (ref 140–400)
POTASSIUM SERPL-SCNC: 4.1 MMOL/L (ref 3.5–5.1)
PROT SERPL-MCNC: 6.8 G/DL (ref 6.4–8.2)
RBC # BLD AUTO: 4.71 X10^6/UL (ref 4.3–5.7)
SODIUM SERPL-SCNC: 140 MMOL/L (ref 136–145)
WBC # BLD AUTO: 5.6 X10^3/UL (ref 4–11)

## 2021-03-01 PROCEDURE — 36415 COLL VENOUS BLD VENIPUNCTURE: CPT

## 2021-03-01 PROCEDURE — 80053 COMPREHEN METABOLIC PANEL: CPT

## 2021-03-01 PROCEDURE — 85025 COMPLETE CBC W/AUTO DIFF WBC: CPT

## 2021-03-04 ENCOUNTER — HOSPITAL ENCOUNTER (OUTPATIENT)
Dept: HOSPITAL 61 - ONCLAB | Age: 56
End: 2021-03-04
Attending: INTERNAL MEDICINE
Payer: COMMERCIAL

## 2021-03-04 DIAGNOSIS — C34.12: Primary | ICD-10-CM

## 2021-03-04 LAB
ALBUMIN SERPL-MCNC: 3.3 G/DL (ref 3.4–5)
ALBUMIN/GLOB SERPL: 1.1 {RATIO} (ref 1–1.7)
ALP SERPL-CCNC: 82 U/L (ref 46–116)
ALT SERPL-CCNC: 50 U/L (ref 16–63)
ANION GAP SERPL CALC-SCNC: 7 MMOL/L (ref 6–14)
AST SERPL-CCNC: 19 U/L (ref 15–37)
BASOPHILS # BLD AUTO: 0 X10^3/UL (ref 0–0.2)
BASOPHILS NFR BLD: 0 % (ref 0–3)
BILIRUB SERPL-MCNC: 0.8 MG/DL (ref 0.2–1)
BUN SERPL-MCNC: 15 MG/DL (ref 8–26)
BUN/CREAT SERPL: 15 (ref 6–20)
CALCIUM SERPL-MCNC: 7.9 MG/DL (ref 8.5–10.1)
CHLORIDE SERPL-SCNC: 103 MMOL/L (ref 98–107)
CO2 SERPL-SCNC: 29 MMOL/L (ref 21–32)
CREAT SERPL-MCNC: 1 MG/DL (ref 0.7–1.3)
EOSINOPHIL NFR BLD: 0.1 X10^3/UL (ref 0–0.7)
EOSINOPHIL NFR BLD: 2 % (ref 0–3)
ERYTHROCYTE [DISTWIDTH] IN BLOOD BY AUTOMATED COUNT: 14.9 % (ref 11.5–14.5)
GFR SERPLBLD BASED ON 1.73 SQ M-ARVRAT: 77.6 ML/MIN
GLUCOSE SERPL-MCNC: 104 MG/DL (ref 70–99)
HCT VFR BLD CALC: 40.4 % (ref 39–53)
HGB BLD-MCNC: 13.3 G/DL (ref 13–17.5)
LYMPHOCYTES # BLD: 0.9 X10^3/UL (ref 1–4.8)
LYMPHOCYTES NFR BLD AUTO: 13 % (ref 24–48)
MAGNESIUM SERPL-MCNC: 2 MG/DL (ref 1.8–2.4)
MCH RBC QN AUTO: 29 PG (ref 25–35)
MCHC RBC AUTO-ENTMCNC: 33 G/DL (ref 31–37)
MCV RBC AUTO: 90 FL (ref 79–100)
MONO #: 0.7 X10^3/UL (ref 0–1.1)
MONOCYTES NFR BLD: 10 % (ref 0–9)
NEUT #: 5.4 X10^3/UL (ref 1.8–7.7)
NEUTROPHILS NFR BLD AUTO: 76 % (ref 31–73)
PLATELET # BLD AUTO: 214 X10^3/UL (ref 140–400)
POTASSIUM SERPL-SCNC: 3.8 MMOL/L (ref 3.5–5.1)
PROT SERPL-MCNC: 6.2 G/DL (ref 6.4–8.2)
RBC # BLD AUTO: 4.51 X10^6/UL (ref 4.3–5.7)
SODIUM SERPL-SCNC: 139 MMOL/L (ref 136–145)
WBC # BLD AUTO: 7.2 X10^3/UL (ref 4–11)

## 2021-03-04 PROCEDURE — 80053 COMPREHEN METABOLIC PANEL: CPT

## 2021-03-04 PROCEDURE — 85025 COMPLETE CBC W/AUTO DIFF WBC: CPT

## 2021-03-04 PROCEDURE — 36415 COLL VENOUS BLD VENIPUNCTURE: CPT

## 2021-03-04 PROCEDURE — 83735 ASSAY OF MAGNESIUM: CPT

## 2021-03-08 ENCOUNTER — HOSPITAL ENCOUNTER (OUTPATIENT)
Dept: HOSPITAL 61 - ONCLAB | Age: 56
End: 2021-03-08
Attending: INTERNAL MEDICINE
Payer: COMMERCIAL

## 2021-03-08 DIAGNOSIS — C34.12: Primary | ICD-10-CM

## 2021-03-08 LAB
ALBUMIN SERPL-MCNC: 3.5 G/DL (ref 3.4–5)
ALBUMIN/GLOB SERPL: 1 {RATIO} (ref 1–1.7)
ALP SERPL-CCNC: 95 U/L (ref 46–116)
ALT SERPL-CCNC: 40 U/L (ref 16–63)
ANION GAP SERPL CALC-SCNC: 6 MMOL/L (ref 6–14)
AST SERPL-CCNC: 17 U/L (ref 15–37)
BASOPHILS # BLD AUTO: 0 X10^3/UL (ref 0–0.2)
BASOPHILS NFR BLD: 1 % (ref 0–3)
BILIRUB SERPL-MCNC: 0.2 MG/DL (ref 0.2–1)
BUN SERPL-MCNC: 18 MG/DL (ref 8–26)
BUN/CREAT SERPL: 18 (ref 6–20)
CALCIUM SERPL-MCNC: 8.6 MG/DL (ref 8.5–10.1)
CHLORIDE SERPL-SCNC: 102 MMOL/L (ref 98–107)
CO2 SERPL-SCNC: 30 MMOL/L (ref 21–32)
CREAT SERPL-MCNC: 1 MG/DL (ref 0.7–1.3)
EOSINOPHIL NFR BLD: 0.3 X10^3/UL (ref 0–0.7)
EOSINOPHIL NFR BLD: 5 % (ref 0–3)
ERYTHROCYTE [DISTWIDTH] IN BLOOD BY AUTOMATED COUNT: 14.8 % (ref 11.5–14.5)
GFR SERPLBLD BASED ON 1.73 SQ M-ARVRAT: 77.6 ML/MIN
GLUCOSE SERPL-MCNC: 76 MG/DL (ref 70–99)
HCT VFR BLD CALC: 41.9 % (ref 39–53)
HGB BLD-MCNC: 13.8 G/DL (ref 13–17.5)
LYMPHOCYTES # BLD: 1.2 X10^3/UL (ref 1–4.8)
LYMPHOCYTES NFR BLD AUTO: 18 % (ref 24–48)
MCH RBC QN AUTO: 30 PG (ref 25–35)
MCHC RBC AUTO-ENTMCNC: 33 G/DL (ref 31–37)
MCV RBC AUTO: 90 FL (ref 79–100)
MONO #: 1.1 X10^3/UL (ref 0–1.1)
MONOCYTES NFR BLD: 16 % (ref 0–9)
NEUT #: 4.1 X10^3/UL (ref 1.8–7.7)
NEUTROPHILS NFR BLD AUTO: 61 % (ref 31–73)
PLATELET # BLD AUTO: 212 X10^3/UL (ref 140–400)
POTASSIUM SERPL-SCNC: 4.2 MMOL/L (ref 3.5–5.1)
PROT SERPL-MCNC: 7 G/DL (ref 6.4–8.2)
RBC # BLD AUTO: 4.68 X10^6/UL (ref 4.3–5.7)
SODIUM SERPL-SCNC: 138 MMOL/L (ref 136–145)
WBC # BLD AUTO: 6.6 X10^3/UL (ref 4–11)

## 2021-03-08 PROCEDURE — 36415 COLL VENOUS BLD VENIPUNCTURE: CPT

## 2021-03-08 PROCEDURE — 85025 COMPLETE CBC W/AUTO DIFF WBC: CPT

## 2021-03-08 PROCEDURE — 80053 COMPREHEN METABOLIC PANEL: CPT

## 2021-03-15 ENCOUNTER — HOSPITAL ENCOUNTER (OUTPATIENT)
Dept: HOSPITAL 61 - ONCLAB | Age: 56
End: 2021-03-15
Attending: PHYSICIAN ASSISTANT
Payer: COMMERCIAL

## 2021-03-15 DIAGNOSIS — C34.12: Primary | ICD-10-CM

## 2021-03-15 LAB
ALBUMIN SERPL-MCNC: 3.7 G/DL (ref 3.4–5)
ALBUMIN/GLOB SERPL: 1 {RATIO} (ref 1–1.7)
ALP SERPL-CCNC: 102 U/L (ref 46–116)
ALT SERPL-CCNC: 40 U/L (ref 16–63)
ANION GAP SERPL CALC-SCNC: 7 MMOL/L (ref 6–14)
AST SERPL-CCNC: 18 U/L (ref 15–37)
BASOPHILS # BLD AUTO: 0 X10^3/UL (ref 0–0.2)
BASOPHILS NFR BLD: 1 % (ref 0–3)
BILIRUB SERPL-MCNC: 0.3 MG/DL (ref 0.2–1)
BUN SERPL-MCNC: 17 MG/DL (ref 8–26)
BUN/CREAT SERPL: 17 (ref 6–20)
CALCIUM SERPL-MCNC: 9 MG/DL (ref 8.5–10.1)
CHLORIDE SERPL-SCNC: 103 MMOL/L (ref 98–107)
CO2 SERPL-SCNC: 30 MMOL/L (ref 21–32)
CREAT SERPL-MCNC: 1 MG/DL (ref 0.7–1.3)
EOSINOPHIL NFR BLD: 0.2 X10^3/UL (ref 0–0.7)
EOSINOPHIL NFR BLD: 3 % (ref 0–3)
ERYTHROCYTE [DISTWIDTH] IN BLOOD BY AUTOMATED COUNT: 15.5 % (ref 11.5–14.5)
GFR SERPLBLD BASED ON 1.73 SQ M-ARVRAT: 77.6 ML/MIN
GLUCOSE SERPL-MCNC: 87 MG/DL (ref 70–99)
HCT VFR BLD CALC: 43 % (ref 39–53)
HGB BLD-MCNC: 13.8 G/DL (ref 13–17.5)
LYMPHOCYTES # BLD: 0.8 X10^3/UL (ref 1–4.8)
LYMPHOCYTES NFR BLD AUTO: 13 % (ref 24–48)
MCH RBC QN AUTO: 29 PG (ref 25–35)
MCHC RBC AUTO-ENTMCNC: 32 G/DL (ref 31–37)
MCV RBC AUTO: 89 FL (ref 79–100)
MONO #: 0.9 X10^3/UL (ref 0–1.1)
MONOCYTES NFR BLD: 14 % (ref 0–9)
NEUT #: 4.5 X10^3/UL (ref 1.8–7.7)
NEUTROPHILS NFR BLD AUTO: 70 % (ref 31–73)
PLATELET # BLD AUTO: 227 X10^3/UL (ref 140–400)
POTASSIUM SERPL-SCNC: 4.1 MMOL/L (ref 3.5–5.1)
PROT SERPL-MCNC: 7.3 G/DL (ref 6.4–8.2)
RBC # BLD AUTO: 4.81 X10^6/UL (ref 4.3–5.7)
SODIUM SERPL-SCNC: 140 MMOL/L (ref 136–145)
WBC # BLD AUTO: 6.4 X10^3/UL (ref 4–11)

## 2021-03-15 PROCEDURE — 85025 COMPLETE CBC W/AUTO DIFF WBC: CPT

## 2021-03-15 PROCEDURE — 36415 COLL VENOUS BLD VENIPUNCTURE: CPT

## 2021-03-15 PROCEDURE — 80053 COMPREHEN METABOLIC PANEL: CPT

## 2021-03-22 ENCOUNTER — HOSPITAL ENCOUNTER (OUTPATIENT)
Dept: HOSPITAL 61 - ONCLAB | Age: 56
End: 2021-03-22
Attending: INTERNAL MEDICINE
Payer: COMMERCIAL

## 2021-03-22 DIAGNOSIS — C34.12: Primary | ICD-10-CM

## 2021-03-22 LAB
% BANDS: 1 % (ref 0–9)
% LYMPHS: 30 % (ref 24–48)
% MONOS: 16 % (ref 0–10)
% SEGS: 47 % (ref 35–66)
ALBUMIN SERPL-MCNC: 3.2 G/DL (ref 3.4–5)
ALBUMIN/GLOB SERPL: 1 {RATIO} (ref 1–1.7)
ALP SERPL-CCNC: 93 U/L (ref 46–116)
ALT SERPL-CCNC: 33 U/L (ref 16–63)
ANION GAP SERPL CALC-SCNC: 10 MMOL/L (ref 6–14)
AST SERPL-CCNC: 16 U/L (ref 15–37)
BASOPHILS # BLD AUTO: 0 X10^3/UL (ref 0–0.2)
BASOPHILS NFR BLD: 0 % (ref 0–3)
BILIRUB SERPL-MCNC: 0.2 MG/DL (ref 0.2–1)
BUN SERPL-MCNC: 19 MG/DL (ref 8–26)
BUN/CREAT SERPL: 17 (ref 6–20)
CALCIUM SERPL-MCNC: 8.4 MG/DL (ref 8.5–10.1)
CHLORIDE SERPL-SCNC: 105 MMOL/L (ref 98–107)
CO2 SERPL-SCNC: 26 MMOL/L (ref 21–32)
CREAT SERPL-MCNC: 1.1 MG/DL (ref 0.7–1.3)
EOSINOPHIL NFR BLD AUTO: 6 % (ref 0–5)
EOSINOPHIL NFR BLD: 0.3 X10^3/UL (ref 0–0.7)
EOSINOPHIL NFR BLD: 7 % (ref 0–3)
ERYTHROCYTE [DISTWIDTH] IN BLOOD BY AUTOMATED COUNT: 15.9 % (ref 11.5–14.5)
GFR SERPLBLD BASED ON 1.73 SQ M-ARVRAT: 69.5 ML/MIN
GLUCOSE SERPL-MCNC: 130 MG/DL (ref 70–99)
HCT VFR BLD CALC: 40.7 % (ref 39–53)
HGB BLD-MCNC: 13.4 G/DL (ref 13–17.5)
LYMPHOCYTES # BLD: 0.9 X10^3/UL (ref 1–4.8)
LYMPHOCYTES NFR BLD AUTO: 23 % (ref 24–48)
MCH RBC QN AUTO: 30 PG (ref 25–35)
MCHC RBC AUTO-ENTMCNC: 33 G/DL (ref 31–37)
MCV RBC AUTO: 90 FL (ref 79–100)
MONO #: 0.8 X10^3/UL (ref 0–1.1)
MONOCYTES NFR BLD: 19 % (ref 0–9)
NEUT #: 2.1 X10^3/UL (ref 1.8–7.7)
NEUTROPHILS NFR BLD AUTO: 51 % (ref 31–73)
PLATELET # BLD AUTO: 299 X10^3/UL (ref 140–400)
PLATELET # BLD EST: ADEQUATE 10*3/UL
POTASSIUM SERPL-SCNC: 4 MMOL/L (ref 3.5–5.1)
PROT SERPL-MCNC: 6.5 G/DL (ref 6.4–8.2)
RBC # BLD AUTO: 4.51 X10^6/UL (ref 4.3–5.7)
SODIUM SERPL-SCNC: 141 MMOL/L (ref 136–145)
WBC # BLD AUTO: 4.1 X10^3/UL (ref 4–11)

## 2021-03-22 PROCEDURE — 80053 COMPREHEN METABOLIC PANEL: CPT

## 2021-03-22 PROCEDURE — 85025 COMPLETE CBC W/AUTO DIFF WBC: CPT

## 2021-03-22 PROCEDURE — 36415 COLL VENOUS BLD VENIPUNCTURE: CPT

## 2021-03-22 PROCEDURE — 85007 BL SMEAR W/DIFF WBC COUNT: CPT

## 2021-03-29 ENCOUNTER — HOSPITAL ENCOUNTER (OUTPATIENT)
Dept: HOSPITAL 61 - ONCLAB | Age: 56
End: 2021-03-29
Attending: INTERNAL MEDICINE
Payer: COMMERCIAL

## 2021-03-29 DIAGNOSIS — C34.12: Primary | ICD-10-CM

## 2021-03-29 LAB
ALBUMIN SERPL-MCNC: 3.4 G/DL (ref 3.4–5)
ALBUMIN/GLOB SERPL: 1 {RATIO} (ref 1–1.7)
ALP SERPL-CCNC: 86 U/L (ref 46–116)
ALT SERPL-CCNC: 33 U/L (ref 16–63)
ANION GAP SERPL CALC-SCNC: 11 MMOL/L (ref 6–14)
AST SERPL-CCNC: 12 U/L (ref 15–37)
BASOPHILS # BLD AUTO: 0 X10^3/UL (ref 0–0.2)
BASOPHILS NFR BLD: 0 % (ref 0–3)
BILIRUB SERPL-MCNC: 0.2 MG/DL (ref 0.2–1)
BUN SERPL-MCNC: 23 MG/DL (ref 8–26)
BUN/CREAT SERPL: 21 (ref 6–20)
CALCIUM SERPL-MCNC: 8.2 MG/DL (ref 8.5–10.1)
CHLORIDE SERPL-SCNC: 104 MMOL/L (ref 98–107)
CO2 SERPL-SCNC: 26 MMOL/L (ref 21–32)
CREAT SERPL-MCNC: 1.1 MG/DL (ref 0.7–1.3)
EOSINOPHIL NFR BLD: 0 % (ref 0–3)
EOSINOPHIL NFR BLD: 0 X10^3/UL (ref 0–0.7)
ERYTHROCYTE [DISTWIDTH] IN BLOOD BY AUTOMATED COUNT: 15.5 % (ref 11.5–14.5)
GFR SERPLBLD BASED ON 1.73 SQ M-ARVRAT: 69.5 ML/MIN
GLUCOSE SERPL-MCNC: 108 MG/DL (ref 70–99)
HCT VFR BLD CALC: 39.5 % (ref 39–53)
HGB BLD-MCNC: 13.1 G/DL (ref 13–17.5)
LYMPHOCYTES # BLD: 1.5 X10^3/UL (ref 1–4.8)
LYMPHOCYTES NFR BLD AUTO: 17 % (ref 24–48)
MCH RBC QN AUTO: 30 PG (ref 25–35)
MCHC RBC AUTO-ENTMCNC: 33 G/DL (ref 31–37)
MCV RBC AUTO: 90 FL (ref 79–100)
MONO #: 1 X10^3/UL (ref 0–1.1)
MONOCYTES NFR BLD: 12 % (ref 0–9)
NEUT #: 6.2 X10^3/UL (ref 1.8–7.7)
NEUTROPHILS NFR BLD AUTO: 71 % (ref 31–73)
PLATELET # BLD AUTO: 168 X10^3/UL (ref 140–400)
POTASSIUM SERPL-SCNC: 3.5 MMOL/L (ref 3.5–5.1)
PROT SERPL-MCNC: 6.7 G/DL (ref 6.4–8.2)
RBC # BLD AUTO: 4.42 X10^6/UL (ref 4.3–5.7)
SODIUM SERPL-SCNC: 141 MMOL/L (ref 136–145)
WBC # BLD AUTO: 8.8 X10^3/UL (ref 4–11)

## 2021-03-29 PROCEDURE — 80053 COMPREHEN METABOLIC PANEL: CPT

## 2021-03-29 PROCEDURE — 85025 COMPLETE CBC W/AUTO DIFF WBC: CPT

## 2021-03-29 PROCEDURE — 36415 COLL VENOUS BLD VENIPUNCTURE: CPT

## 2021-04-05 ENCOUNTER — HOSPITAL ENCOUNTER (OUTPATIENT)
Dept: HOSPITAL 61 - ONCLAB | Age: 56
End: 2021-04-05
Attending: PHYSICIAN ASSISTANT
Payer: COMMERCIAL

## 2021-04-05 DIAGNOSIS — C34.12: Primary | ICD-10-CM

## 2021-04-05 LAB
ANION GAP SERPL CALC-SCNC: 6 MMOL/L (ref 6–14)
BASOPHILS # BLD AUTO: 0 X10^3/UL (ref 0–0.2)
BASOPHILS NFR BLD: 0 % (ref 0–3)
BUN SERPL-MCNC: 19 MG/DL (ref 8–26)
CALCIUM SERPL-MCNC: 8.9 MG/DL (ref 8.5–10.1)
CHLORIDE SERPL-SCNC: 102 MMOL/L (ref 98–107)
CO2 SERPL-SCNC: 31 MMOL/L (ref 21–32)
CREAT SERPL-MCNC: 1 MG/DL (ref 0.7–1.3)
EOSINOPHIL NFR BLD: 0.1 X10^3/UL (ref 0–0.7)
EOSINOPHIL NFR BLD: 1 % (ref 0–3)
ERYTHROCYTE [DISTWIDTH] IN BLOOD BY AUTOMATED COUNT: 16.3 % (ref 11.5–14.5)
GFR SERPLBLD BASED ON 1.73 SQ M-ARVRAT: 77.6 ML/MIN
GLUCOSE SERPL-MCNC: 134 MG/DL (ref 70–99)
HCT VFR BLD CALC: 41.7 % (ref 39–53)
HGB BLD-MCNC: 13.7 G/DL (ref 13–17.5)
LYMPHOCYTES # BLD: 0.8 X10^3/UL (ref 1–4.8)
LYMPHOCYTES NFR BLD AUTO: 14 % (ref 24–48)
MCH RBC QN AUTO: 30 PG (ref 25–35)
MCHC RBC AUTO-ENTMCNC: 33 G/DL (ref 31–37)
MCV RBC AUTO: 90 FL (ref 79–100)
MONO #: 0.7 X10^3/UL (ref 0–1.1)
MONOCYTES NFR BLD: 12 % (ref 0–9)
NEUT #: 4.2 X10^3/UL (ref 1.8–7.7)
NEUTROPHILS NFR BLD AUTO: 73 % (ref 31–73)
PLATELET # BLD AUTO: 170 X10^3/UL (ref 140–400)
POTASSIUM SERPL-SCNC: 4.5 MMOL/L (ref 3.5–5.1)
RBC # BLD AUTO: 4.63 X10^6/UL (ref 4.3–5.7)
SODIUM SERPL-SCNC: 139 MMOL/L (ref 136–145)
WBC # BLD AUTO: 5.7 X10^3/UL (ref 4–11)

## 2021-04-05 PROCEDURE — 36415 COLL VENOUS BLD VENIPUNCTURE: CPT

## 2021-04-05 PROCEDURE — 85025 COMPLETE CBC W/AUTO DIFF WBC: CPT

## 2021-04-05 PROCEDURE — 80048 BASIC METABOLIC PNL TOTAL CA: CPT

## 2021-04-12 ENCOUNTER — HOSPITAL ENCOUNTER (OUTPATIENT)
Dept: HOSPITAL 61 - ONCLAB | Age: 56
End: 2021-04-12
Attending: INTERNAL MEDICINE
Payer: COMMERCIAL

## 2021-04-12 DIAGNOSIS — C34.12: Primary | ICD-10-CM

## 2021-04-12 LAB
ALBUMIN SERPL-MCNC: 3.5 G/DL (ref 3.4–5)
ALBUMIN/GLOB SERPL: 1.1 {RATIO} (ref 1–1.7)
ALP SERPL-CCNC: 92 U/L (ref 46–116)
ALT SERPL-CCNC: 30 U/L (ref 16–63)
ANION GAP SERPL CALC-SCNC: 6 MMOL/L (ref 6–14)
AST SERPL-CCNC: 18 U/L (ref 15–37)
BASOPHILS # BLD AUTO: 0 X10^3/UL (ref 0–0.2)
BASOPHILS NFR BLD: 1 % (ref 0–3)
BILIRUB SERPL-MCNC: 0.3 MG/DL (ref 0.2–1)
BUN SERPL-MCNC: 21 MG/DL (ref 8–26)
BUN/CREAT SERPL: 18 (ref 6–20)
CALCIUM SERPL-MCNC: 8.4 MG/DL (ref 8.5–10.1)
CHLORIDE SERPL-SCNC: 105 MMOL/L (ref 98–107)
CO2 SERPL-SCNC: 26 MMOL/L (ref 21–32)
CREAT SERPL-MCNC: 1.2 MG/DL (ref 0.7–1.3)
EOSINOPHIL NFR BLD: 0.2 X10^3/UL (ref 0–0.7)
EOSINOPHIL NFR BLD: 5 % (ref 0–3)
ERYTHROCYTE [DISTWIDTH] IN BLOOD BY AUTOMATED COUNT: 16.1 % (ref 11.5–14.5)
GFR SERPLBLD BASED ON 1.73 SQ M-ARVRAT: 62.9 ML/MIN
GLUCOSE SERPL-MCNC: 180 MG/DL (ref 70–99)
HCT VFR BLD CALC: 40.6 % (ref 39–53)
HGB BLD-MCNC: 13.4 G/DL (ref 13–17.5)
LYMPHOCYTES # BLD: 0.7 X10^3/UL (ref 1–4.8)
LYMPHOCYTES NFR BLD AUTO: 22 % (ref 24–48)
MCH RBC QN AUTO: 30 PG (ref 25–35)
MCHC RBC AUTO-ENTMCNC: 33 G/DL (ref 31–37)
MCV RBC AUTO: 90 FL (ref 79–100)
MONO #: 0.5 X10^3/UL (ref 0–1.1)
MONOCYTES NFR BLD: 14 % (ref 0–9)
NEUT #: 1.9 X10^3/UL (ref 1.8–7.7)
NEUTROPHILS NFR BLD AUTO: 58 % (ref 31–73)
PLATELET # BLD AUTO: 251 X10^3/UL (ref 140–400)
POTASSIUM SERPL-SCNC: 4.2 MMOL/L (ref 3.5–5.1)
PROT SERPL-MCNC: 6.7 G/DL (ref 6.4–8.2)
RBC # BLD AUTO: 4.5 X10^6/UL (ref 4.3–5.7)
SODIUM SERPL-SCNC: 137 MMOL/L (ref 136–145)
WBC # BLD AUTO: 3.3 X10^3/UL (ref 4–11)

## 2021-04-12 PROCEDURE — 80053 COMPREHEN METABOLIC PANEL: CPT

## 2021-04-12 PROCEDURE — 36415 COLL VENOUS BLD VENIPUNCTURE: CPT

## 2021-04-12 PROCEDURE — 85025 COMPLETE CBC W/AUTO DIFF WBC: CPT

## 2021-04-19 ENCOUNTER — HOSPITAL ENCOUNTER (OUTPATIENT)
Dept: HOSPITAL 61 - ONCLAB | Age: 56
End: 2021-04-19
Attending: INTERNAL MEDICINE
Payer: COMMERCIAL

## 2021-04-19 DIAGNOSIS — C34.12: Primary | ICD-10-CM

## 2021-04-19 LAB
ALBUMIN SERPL-MCNC: 3.4 G/DL (ref 3.4–5)
ALBUMIN/GLOB SERPL: 0.9 {RATIO} (ref 1–1.7)
ALP SERPL-CCNC: 100 U/L (ref 46–116)
ALT SERPL-CCNC: 32 U/L (ref 16–63)
ANION GAP SERPL CALC-SCNC: 12 MMOL/L (ref 6–14)
AST SERPL-CCNC: 14 U/L (ref 15–37)
BASOPHILS # BLD AUTO: 0 X10^3/UL (ref 0–0.2)
BASOPHILS NFR BLD: 0 % (ref 0–3)
BILIRUB SERPL-MCNC: 0.3 MG/DL (ref 0.2–1)
BUN SERPL-MCNC: 23 MG/DL (ref 8–26)
BUN/CREAT SERPL: 19 (ref 6–20)
CALCIUM SERPL-MCNC: 8.9 MG/DL (ref 8.5–10.1)
CHLORIDE SERPL-SCNC: 100 MMOL/L (ref 98–107)
CO2 SERPL-SCNC: 28 MMOL/L (ref 21–32)
CREAT SERPL-MCNC: 1.2 MG/DL (ref 0.7–1.3)
EOSINOPHIL NFR BLD: 0 X10^3/UL (ref 0–0.7)
EOSINOPHIL NFR BLD: 1 % (ref 0–3)
ERYTHROCYTE [DISTWIDTH] IN BLOOD BY AUTOMATED COUNT: 16.7 % (ref 11.5–14.5)
GFR SERPLBLD BASED ON 1.73 SQ M-ARVRAT: 62.6 ML/MIN
GLUCOSE SERPL-MCNC: 228 MG/DL (ref 70–99)
HCT VFR BLD CALC: 40.5 % (ref 39–53)
HGB BLD-MCNC: 13.7 G/DL (ref 13–17.5)
LYMPHOCYTES # BLD: 0.8 X10^3/UL (ref 1–4.8)
LYMPHOCYTES NFR BLD AUTO: 20 % (ref 24–48)
MCH RBC QN AUTO: 30 PG (ref 25–35)
MCHC RBC AUTO-ENTMCNC: 34 G/DL (ref 31–37)
MCV RBC AUTO: 90 FL (ref 79–100)
MONO #: 0.5 X10^3/UL (ref 0–1.1)
MONOCYTES NFR BLD: 11 % (ref 0–9)
NEUT #: 2.8 X10^3/UL (ref 1.8–7.7)
NEUTROPHILS NFR BLD AUTO: 68 % (ref 31–73)
PLATELET # BLD AUTO: 134 X10^3/UL (ref 140–400)
POTASSIUM SERPL-SCNC: 4.1 MMOL/L (ref 3.5–5.1)
PROT SERPL-MCNC: 7.1 G/DL (ref 6.4–8.2)
RBC # BLD AUTO: 4.52 X10^6/UL (ref 4.3–5.7)
SODIUM SERPL-SCNC: 140 MMOL/L (ref 136–145)
WBC # BLD AUTO: 4.2 X10^3/UL (ref 4–11)

## 2021-04-19 PROCEDURE — 80053 COMPREHEN METABOLIC PANEL: CPT

## 2021-04-19 PROCEDURE — 85025 COMPLETE CBC W/AUTO DIFF WBC: CPT

## 2021-04-19 PROCEDURE — 36415 COLL VENOUS BLD VENIPUNCTURE: CPT

## 2021-04-26 ENCOUNTER — HOSPITAL ENCOUNTER (OUTPATIENT)
Dept: HOSPITAL 61 - CT | Age: 56
End: 2021-04-26
Attending: FAMILY MEDICINE
Payer: COMMERCIAL

## 2021-04-26 ENCOUNTER — HOSPITAL ENCOUNTER (INPATIENT)
Dept: HOSPITAL 61 - ER | Age: 56
LOS: 3 days | Discharge: HOME | DRG: 176 | End: 2021-04-29
Attending: STUDENT IN AN ORGANIZED HEALTH CARE EDUCATION/TRAINING PROGRAM | Admitting: STUDENT IN AN ORGANIZED HEALTH CARE EDUCATION/TRAINING PROGRAM
Payer: COMMERCIAL

## 2021-04-26 VITALS — SYSTOLIC BLOOD PRESSURE: 139 MMHG | DIASTOLIC BLOOD PRESSURE: 94 MMHG

## 2021-04-26 VITALS — SYSTOLIC BLOOD PRESSURE: 134 MMHG | DIASTOLIC BLOOD PRESSURE: 78 MMHG

## 2021-04-26 VITALS — HEIGHT: 73 IN | WEIGHT: 218.04 LBS | BODY MASS INDEX: 28.9 KG/M2

## 2021-04-26 DIAGNOSIS — C34.90: Primary | ICD-10-CM

## 2021-04-26 DIAGNOSIS — C34.90: ICD-10-CM

## 2021-04-26 DIAGNOSIS — Z80.42: ICD-10-CM

## 2021-04-26 DIAGNOSIS — R91.1: ICD-10-CM

## 2021-04-26 DIAGNOSIS — K21.9: ICD-10-CM

## 2021-04-26 DIAGNOSIS — I82.451: ICD-10-CM

## 2021-04-26 DIAGNOSIS — Z90.2: ICD-10-CM

## 2021-04-26 DIAGNOSIS — I26.99: Primary | ICD-10-CM

## 2021-04-26 DIAGNOSIS — I26.99: ICD-10-CM

## 2021-04-26 DIAGNOSIS — Z85.118: ICD-10-CM

## 2021-04-26 DIAGNOSIS — E78.5: ICD-10-CM

## 2021-04-26 DIAGNOSIS — R65.10: ICD-10-CM

## 2021-04-26 DIAGNOSIS — E78.00: ICD-10-CM

## 2021-04-26 DIAGNOSIS — R06.00: ICD-10-CM

## 2021-04-26 DIAGNOSIS — Z85.810: ICD-10-CM

## 2021-04-26 DIAGNOSIS — Z90.49: ICD-10-CM

## 2021-04-26 DIAGNOSIS — I82.431: ICD-10-CM

## 2021-04-26 DIAGNOSIS — I10: ICD-10-CM

## 2021-04-26 LAB
ALBUMIN SERPL-MCNC: 3.7 G/DL (ref 3.4–5)
ALBUMIN SERPL-MCNC: 3.8 G/DL (ref 3.4–5)
ALBUMIN/GLOB SERPL: 1.1 {RATIO} (ref 1–1.7)
ALBUMIN/GLOB SERPL: 1.2 {RATIO} (ref 1–1.7)
ALP SERPL-CCNC: 109 U/L (ref 46–116)
ALP SERPL-CCNC: 123 U/L (ref 46–116)
ALT SERPL-CCNC: 29 U/L (ref 16–63)
ALT SERPL-CCNC: 34 U/L (ref 16–63)
ANION GAP SERPL CALC-SCNC: 11 MMOL/L (ref 6–14)
ANION GAP SERPL CALC-SCNC: 8 MMOL/L (ref 6–14)
APTT BLD: 31 SEC (ref 24–38)
AST SERPL-CCNC: 17 U/L (ref 15–37)
AST SERPL-CCNC: 19 U/L (ref 15–37)
BASOPHILS # BLD AUTO: 0 X10^3/UL (ref 0–0.2)
BASOPHILS # BLD AUTO: 0 X10^3/UL (ref 0–0.2)
BASOPHILS NFR BLD: 1 % (ref 0–3)
BASOPHILS NFR BLD: 1 % (ref 0–3)
BILIRUB SERPL-MCNC: 0.4 MG/DL (ref 0.2–1)
BILIRUB SERPL-MCNC: 0.5 MG/DL (ref 0.2–1)
BUN SERPL-MCNC: 13 MG/DL (ref 8–26)
BUN SERPL-MCNC: 15 MG/DL (ref 8–26)
BUN/CREAT SERPL: 12 (ref 6–20)
BUN/CREAT SERPL: 14 (ref 6–20)
CALCIUM SERPL-MCNC: 8.5 MG/DL (ref 8.5–10.1)
CALCIUM SERPL-MCNC: 8.7 MG/DL (ref 8.5–10.1)
CHLORIDE SERPL-SCNC: 102 MMOL/L (ref 98–107)
CHLORIDE SERPL-SCNC: 103 MMOL/L (ref 98–107)
CO2 SERPL-SCNC: 27 MMOL/L (ref 21–32)
CO2 SERPL-SCNC: 28 MMOL/L (ref 21–32)
CREAT SERPL-MCNC: 1.1 MG/DL (ref 0.7–1.3)
CREAT SERPL-MCNC: 1.1 MG/DL (ref 0.7–1.3)
EOSINOPHIL NFR BLD: 0 X10^3/UL (ref 0–0.7)
EOSINOPHIL NFR BLD: 0 X10^3/UL (ref 0–0.7)
EOSINOPHIL NFR BLD: 1 % (ref 0–3)
EOSINOPHIL NFR BLD: 1 % (ref 0–3)
ERYTHROCYTE [DISTWIDTH] IN BLOOD BY AUTOMATED COUNT: 16.5 % (ref 11.5–14.5)
ERYTHROCYTE [DISTWIDTH] IN BLOOD BY AUTOMATED COUNT: 17 % (ref 11.5–14.5)
GFR SERPLBLD BASED ON 1.73 SQ M-ARVRAT: 69.2 ML/MIN
GFR SERPLBLD BASED ON 1.73 SQ M-ARVRAT: 69.2 ML/MIN
GLUCOSE SERPL-MCNC: 103 MG/DL (ref 70–99)
GLUCOSE SERPL-MCNC: 113 MG/DL (ref 70–99)
HCT VFR BLD CALC: 36.4 % (ref 39–53)
HCT VFR BLD CALC: 38 % (ref 39–53)
HGB BLD-MCNC: 12.3 G/DL (ref 13–17.5)
HGB BLD-MCNC: 12.9 G/DL (ref 13–17.5)
LYMPHOCYTES # BLD: 0.8 X10^3/UL (ref 1–4.8)
LYMPHOCYTES # BLD: 0.9 X10^3/UL (ref 1–4.8)
LYMPHOCYTES NFR BLD AUTO: 16 % (ref 24–48)
LYMPHOCYTES NFR BLD AUTO: 18 % (ref 24–48)
MAGNESIUM SERPL-MCNC: 1.9 MG/DL (ref 1.8–2.4)
MCH RBC QN AUTO: 30 PG (ref 25–35)
MCH RBC QN AUTO: 30 PG (ref 25–35)
MCHC RBC AUTO-ENTMCNC: 34 G/DL (ref 31–37)
MCHC RBC AUTO-ENTMCNC: 34 G/DL (ref 31–37)
MCV RBC AUTO: 89 FL (ref 79–100)
MCV RBC AUTO: 89 FL (ref 79–100)
MONO #: 0.7 X10^3/UL (ref 0–1.1)
MONO #: 0.7 X10^3/UL (ref 0–1.1)
MONOCYTES NFR BLD: 14 % (ref 0–9)
MONOCYTES NFR BLD: 16 % (ref 0–9)
NEUT #: 2.8 X10^3/UL (ref 1.8–7.7)
NEUT #: 3.6 X10^3/UL (ref 1.8–7.7)
NEUTROPHILS NFR BLD AUTO: 65 % (ref 31–73)
NEUTROPHILS NFR BLD AUTO: 68 % (ref 31–73)
PLATELET # BLD AUTO: 162 X10^3/UL (ref 140–400)
PLATELET # BLD AUTO: 170 X10^3/UL (ref 140–400)
POTASSIUM SERPL-SCNC: 4.3 MMOL/L (ref 3.5–5.1)
POTASSIUM SERPL-SCNC: 4.4 MMOL/L (ref 3.5–5.1)
PROT SERPL-MCNC: 6.7 G/DL (ref 6.4–8.2)
PROT SERPL-MCNC: 7.3 G/DL (ref 6.4–8.2)
PROTHROMBIN TIME: 14.7 SEC (ref 11.7–14)
RBC # BLD AUTO: 4.09 X10^6/UL (ref 4.3–5.7)
RBC # BLD AUTO: 4.26 X10^6/UL (ref 4.3–5.7)
SODIUM SERPL-SCNC: 139 MMOL/L (ref 136–145)
SODIUM SERPL-SCNC: 140 MMOL/L (ref 136–145)
WBC # BLD AUTO: 4.3 X10^3/UL (ref 4–11)
WBC # BLD AUTO: 5.3 X10^3/UL (ref 4–11)

## 2021-04-26 PROCEDURE — 85027 COMPLETE CBC AUTOMATED: CPT

## 2021-04-26 PROCEDURE — 93970 EXTREMITY STUDY: CPT

## 2021-04-26 PROCEDURE — 93306 TTE W/DOPPLER COMPLETE: CPT

## 2021-04-26 PROCEDURE — 84484 ASSAY OF TROPONIN QUANT: CPT

## 2021-04-26 PROCEDURE — 85610 PROTHROMBIN TIME: CPT

## 2021-04-26 PROCEDURE — 83735 ASSAY OF MAGNESIUM: CPT

## 2021-04-26 PROCEDURE — 96374 THER/PROPH/DIAG INJ IV PUSH: CPT

## 2021-04-26 PROCEDURE — 93005 ELECTROCARDIOGRAM TRACING: CPT

## 2021-04-26 PROCEDURE — 71275 CT ANGIOGRAPHY CHEST: CPT

## 2021-04-26 PROCEDURE — 85025 COMPLETE CBC W/AUTO DIFF WBC: CPT

## 2021-04-26 PROCEDURE — 85730 THROMBOPLASTIN TIME PARTIAL: CPT

## 2021-04-26 PROCEDURE — 94618 PULMONARY STRESS TESTING: CPT

## 2021-04-26 PROCEDURE — 80053 COMPREHEN METABOLIC PANEL: CPT

## 2021-04-26 PROCEDURE — 36415 COLL VENOUS BLD VENIPUNCTURE: CPT

## 2021-04-26 PROCEDURE — 85520 HEPARIN ASSAY: CPT

## 2021-04-26 PROCEDURE — G0378 HOSPITAL OBSERVATION PER HR: HCPCS

## 2021-04-26 PROCEDURE — 85379 FIBRIN DEGRADATION QUANT: CPT

## 2021-04-26 PROCEDURE — 83880 ASSAY OF NATRIURETIC PEPTIDE: CPT

## 2021-04-26 RX ADMIN — ATORVASTATIN CALCIUM SCH MG: 40 TABLET, FILM COATED ORAL at 20:33

## 2021-04-26 RX ADMIN — HEPARIN SODIUM PRN MLS/HR: 10000 INJECTION, SOLUTION INTRAVENOUS at 18:45

## 2021-04-26 NOTE — EKG
[facility]

              [address] [place] [address]

Test Date:    2021               Test Time:    18:22:26

Pat Name:     MALIA WINKLER            Department:   

Patient ID:   [id number]           Room:          

Gender:       M                        Technician:   

:          1965               Requested By: BETTY RYAN

Order Number: [id number]           Reading MD:     

                                 Measurements

Intervals                              Axis          

Rate:         114                      P:            0

IN:           102                      QRS:          26

QRSD:         78                       T:            31

QT:           312                                    

QTc:          433                                    

                           Interpretive Statements

SINUS TACHYCARDIA

OTHERWISE NORMAL ECG

RI6.02

No previous ECG available for comparison

## 2021-04-26 NOTE — PHYS DOC
Past Medical History


Past Medical History:  High Cholesterol, Hypertension, Lung Disease, Other (lung

cancer)


Past Surgical History:  Other (LEFT UPPER LOBE LUNG RESECTION)


Smoking Status:  Never Smoker





General Adult


EDM:


Chief Complaint:  SHORTNESS OF BREATH





HPI:


HPI:





Patient is a 56  year old male who present to ER due to trouble breathing for 1 

week.  Symptoms have been exacerbated with exertion.  Patient denies any chest 

pain.  Patient has history of lung cancer status post left upper lobe lobectomy,

he is under chemo treatment currently.  About 1 week ago he started having pain 

in his right leg, also with trouble breathing worse with exertion.  His family 

physician ordered a CTA chest today, show bilateral PE.  Patient was put on 

Eliquis, took his first dose today 10 mg.  Patient however having trouble 

breathing with exertion, feeling weak, he called his family physician who told 

to come to ER for admission.





Review of Systems:


Review of Systems:


Constitutional:   Denies fever or chills. []


Eyes:   Denies change in visual acuity. []


HENT:   Denies nasal congestion or sore throat. [] 


Respiratory:   Positive for trouble breathing, no cough.


Cardiovascular:   Denies chest pain or edema. [] 


GI:   Denies abdominal pain, nausea, vomiting, bloody stools or diarrhea. [] 


:  Denies dysuria. [] 


Musculoskeletal:   Denies back pain or joint pain. [] 


Integument:   Denies rash. [] 


Neurologic:   Denies headache, focal weakness or sensory changes. [] 


Endocrine:   Denies polyuria or polydipsia. [] 


Lymphatic:  Denies swollen glands. [] 


Psychiatric:  Denies depression or anxiety. []





Heart Score:


C/O Chest Pain:  N/A


Risk Factors:


Risk Factors:  DM, Current or recent (<one month) smoker, HTN, HLP, family 

history of CAD, obesity.


Risk Scores:


Score 0 - 3:  2.5% MACE over next 6 weeks - Discharge Home


Score 4 - 6:  20.3% MACE over next 6 weeks - Admit for Clinical Observation


Score 7 - 10:  72.7% MACE over next 6 weeks - Early Invasive Strategies





Allergies:


Allergies:





Allergies








Coded Allergies Type Severity Reaction Last Updated Verified


 


  No Known Drug Allergies    20 No











Physical Exam:


PE:





Constitutional: Well developed, well nourished, no acute distress, non-toxic 

appearance. []


HENT: Normocephalic, atraumatic, bilateral external ears normal, oropharynx 

moist, no oral exudates, nose normal. []


Eyes: PERRLA, EOMI, conjunctiva normal, no discharge. [] 


Neck: Normal range of motion, no tenderness, supple, no stridor. [] 


Cardiovascular:  SINUS TACHYCARDIA, WITH  regular rhythm, no murmur []


Lungs & Thorax:  Bilateral breath sounds clear to auscultation []


Abdomen: Bowel sounds normal, soft, no tenderness, no masses, no pulsatile 

masses. [] 


Skin: Warm, dry, no erythema, no rash. [] 


Back: No tenderness, no CVA tenderness. [] 


Extremities: No tenderness, no cyanosis, no clubbing, ROM intact, no edema. NO 

CALF SWELLING, NO LEG SWELLING OR TENDERNESS TO PALPATION.  


Neurologic: Alert and oriented X 3, normal motor function, normal sensory 

function, no focal deficits noted. []


Psychologic: Affect normal, judgement normal, mood normal. []





EKG:


EKG:


EKG was done at 631, heart rate 114 beats per minutes, sinus tachycardia, no ST 

segment elevation, normal axis.





Radiology/Procedures:


Radiology/Procedures:


[]IMAGING REPORT





                                     Signed





PATIENT: MALIA WINKLER  ACCOUNT: [id number]     MRN#: [id number]


: 1965           LOCATION: CT              AGE: 56


SEX: M                    EXAM DT: 21         ACCESSION#: [id number]


STATUS: REG CLI           ORD. PHYSICIAN: DOMINGUEZ PAULINO MD


REASON: DYSPNEA, ADENOCARCINOMA OF LEFT LUNG


PROCEDURE: CT ANGIOGRAPHY CHEST





CTA CHEST





History: DYSPNEA, ADENOCARCINOMA OF LEFT LUNG





Comparison: CT chest 2021





Technique: CTA of the pulmonary arteries with intravenous contrast.





Findings:


Devices: Right chest Mediport with catheter tip terminating at the cavoatrial 

junction.


Pulmonary arteries: Multiple bilateral pulmonary emboli involving the right 

upper, right middle, right lower and left lower lobar branches and extending 

distally. No evidence of right heart strain. The left upper lobe pulmonary 

artery is surgically absent status post left upper lobectomy.


Aorta and great vessels: No aneurysm of the aortic arch or thoracic aorta is 

seen.


Thyroid: No significant abnormalities.


Mediastinum and kostas: No mediastinal masses or adenopathy is seen.


Esophagus: The visualized esophagus is normal.


Heart: The heart is normal in size. There is no pericardial effusion.


Trachea: Left upper lobectomy changes mild central bronchial wall thickening. No

 endobronchial masses.


Lungs: Left upper lobectomy. Redemonstrated 1.5 cm irregular groundglass opacity

 at the right apex (axial image 28); 0.6 cm right upper lobe groundglass opacity

 (axial 41); 8 mm right middle lobe solid and groundglass nodule (axial 81); 

spiculated 9 mm solid and groundglass right middle lobe nodule (axial 87); right

 middle lobe 8 mm spiculated solid and groundglass nodule (axial image 85). 

Areas of consolidation redemonstrated in the right middle lobe. 4 mm and 5 mm 

nodules in the lateral aspect of the left lower lobe (axial 79). Lateral and 

medial left lower lobe atelectasis/scarring.


Pleural space: There is no pneumothorax or pleural effusion.


Upper abdomen: Limited evaluation of the upper abdomen is unremarkable.


Osseous structures and soft tissues: Postsurgical changes of multiple left 

posterior ribs status post lobectomy.





Impression: 





1.  Acute bilateral pan lobar pulmonary emboli. Evidence for right heart strain.

 Findings discussed with ordering provider Dr. Paulino with Dr. Luna at 

approximately 1245.


2.  Multiple bilateral pulmonary nodules redemonstrated for which additional 

sites of primary or metastatic disease remain within the differential.








**********FOR INTERNAL CODING PURPOSES**********





Critical result: Pulmonary emboli.





Findings discussed with  Dr. Paulino by Dr. Luna at 2021 12:45 PM.





RESULT CODE: (C)  








------


Exposure: One or more of the following individualized dose reduction techniques 

were utilized for this examination:  


1. Automated exposure control


2. Adjustment of the mA and/or kV according to patient size


3. Use of iterative reconstruction technique.





Electronically signed by: Dilan Weaver MD (2021 1:00 PM) [facility]














DICTATED and SIGNED BY:     DILAN WEAVER MD


DATE:     21 [id number]





Course & Med Decision Making:


Course & Med Decision Making


Pertinent Labs and Imaging studies reviewed. (See chart for details)





Patient is a 56-year-old male who presented to ER with diagnosis of bilateral 

PE, patient be admitted to hospital for further evaluation and treatment, he 

will be treatment with PE treatment protocol.  Discussed with the hospitalist on

 call, Dr. Raffi Bentley who agreed to admit the patient.





Dragon Disclaimer:


Dragon Disclaimer:


This electronic medical record was generated, in whole or in part, using a voice

 recognition dictation system.





Departure


Departure


Impression:  


   Primary Impression:  


   Bilateral pulmonary embolism


Disposition:   ADMITTED AS INPATIENT


Admitting Physician:  JOCELYNE (Dr. BENTLEY)


Condition:  STABLE


Referrals:  


JESUS ALBERTO MERCER (PCP)











BETTY RYAN DO                2021 18:07

## 2021-04-26 NOTE — PDOC1
History and Physical


Date of Admission


Date of Admission


DATE: 4/26/21 


TIME: 18:31





Identification/Chief Complaint


Chief Complaint


Shortness of breath





Source


Source:  Caregiver, Chart review, Patient





History of Present Illness


History of Present Illness


Is a 56-year-old male past medical history lung cancer, s/p lobectomy, currently

on chemotherapy, who presents to the ED at the behest of his PCP due to 

worsening shortness of breath.  Symptoms are aggravated with exertion.  Patient 

reports worsening shortness of breath over the past week.  He was seen by Dr. Paulino who ordered outpatient CTA today that showed bilateral PEs.  He was 

prescribed Eliquis today, but due to worsening shortness of breath he was 

referred to the ED for further evaluation.  Given the fact the patient had not 

yet initiated Eliquis I recommended IV heparin.  Will admit patient for further 

medical management.





Past Medical History


Past Medical History


Adenocarcinoma, HTN, HLD, GERD





Past Surgical History


Past Surgical History


Left upper lung lobectomy





Family History


Family History


Prostate carcinoma





Social History


Smoke:  No


ALCOHOL:  none


Drugs:  None





Current Problem List


Problem List


Problems


Medical Problems:


(1) Bilateral pulmonary embolism


Status: Acute  











Current Medications


Current Medications





Current Medications


Heparin Sodium (Porcine) (Heparin Sodium) 7,900 unit 1X  ONCE IV ;  Start 

4/26/21 at 18:15;  Stop 4/26/21 at 18:16;  Status DC


Heparin Sodium/ Dextrose 250 ml @ 0 mls/hr CONT  PRN IV PER PROTOCOL;  Start 

4/26/21 at 18:15


Heparin Sodium (Porcine) (Heparin Sodium) 3,000 unit PRN Q6HRS  PRN IV FOR UFH 

LEVEL LESS THAN 0.2;  Start 4/26/21 at 18:15


Heparin Sodium (Porcine) (Heparin Sodium) 1,500 unit PRN Q6HRS  PRN IV FOR UFH 

LEVEL 0.2 - 0.29;  Start 4/26/21 at 18:15





Active Scripts


Active


Reported


Crestor (Rosuvastatin Calcium) 40 Mg Tablet 40 Mg PO HS


Protonix  ** (Pantoprazole Sodium) 40 Mg Tablet.dr 40 Mg PO DAILYAC


Vitamin D3 (Cholecalciferol (Vitamin D3)) 2,400 Unit/1 Ml Liquid 5,000 Unit MC 

DAILY


Folic Acid 0.8 Mg Tablet 0.8 Mg PO DAILY


B12 Active (Mecobalamin) 1,000 Mcg Tab.chew 2,500 Mcg PO DAILY


Metoprolol Succinate ( Xl ) (Metoprolol Succinate) 25 Mg Tab.er.24h 1 Tab PO 

DAILY


Claritin (Loratadine) 10 Mg Capsule 1 Cap PO DAILY 30 Days


Atorvastatin Calcium 40 Mg Tablet 1 Tab PO DAILY


Pepcid (Famotidine) 40 Mg Tablet 40 Mg PO DAILY


Flonase Allergy Relief (Fluticasone Propionate) 9.9 Ml Spray.susp 2 Sprays NS D

AILY





Allergies


Allergies:  


Coded Allergies:  


     No Known Drug Allergies (Unverified , 8/26/20)





ROS


Review of System


GENERAL:  No history of weight change, weakness or fevers.


SKIN:  No bruising, hair changes or rashes.


EYES:  No blurred, double or loss of vision.


NOSE AND THROAT:  No history of nosebleeds, hoarseness or sore throat.


HEART:  Denies chest pain, denies palpitations.


LUNGS: Shortness of breath.  Denies cough, hemoptysis, or wheezing.


GASTROINTESTINAL: Denies nausea, vomiting, abdominal pain.


GENITOURINARY: Denies dysuria, frequency, urgency, hematuria.


NEUROLOGIC:  Denies history of numbness, tingling, tremor or weakness.


PSYCHIATRIC: Denies anxiety, denies depression.


ENDOCRINE:  No history of heat or cold intolerance, polyuria or polydipsia.


EXTREMITIES:  Denies muscle weakness, joint pain, pain on walking or stiffness.





Physical Exam


Physical Exam


General:  Alert, Oriented X3, Cooperative, No acute distress


HEENT:  PERRLA, EOMI


Lungs:  Clear to auscultation, Normal air movement


Heart:  RRR, no murmurs


Cardiovascular:  S1, S2


Abdomen:  Normal bowel sounds, Soft, No tenderness


Extremities:  No clubbing, No cyanosis


Skin:  No rashes, No significant lesion


Neuro:  Normal speech, Normal tone, Sensation intact


Psych/Mental Status:  Mental status NL, Mood NL





Vitals


Vitals





Vital Signs








  Date Time  Temp Pulse Resp B/P (MAP) Pulse Ox O2 Delivery O2 Flow Rate FiO2


 


4/26/21 18:02 98.2 138 16 151/92 (111) 94 Room Air  





 98.2       











Images


Images


CTA CHEST





History: DYSPNEA, ADENOCARCINOMA OF LEFT LUNG





Comparison: CT chest 2/16/2021





Technique: CTA of the pulmonary arteries with intravenous contrast.





Findings:


Devices: Right chest Mediport with catheter tip terminating at the cavoatrial 

junction.


Pulmonary arteries: Multiple bilateral pulmonary emboli involving the right 

upper, right middle, right lower and left lower lobar branches and extending 

distally. No evidence of right heart strain. The left upper lobe pulmonary 

artery is surgically absent status post left upper lobectomy.


Aorta and great vessels: No aneurysm of the aortic arch or thoracic aorta is 

seen.


Thyroid: No significant abnormalities.


Mediastinum and kostas: No mediastinal masses or adenopathy is seen.


Esophagus: The visualized esophagus is normal.


Heart: The heart is normal in size. There is no pericardial effusion.


Trachea: Left upper lobectomy changes mild central bronchial wall thickening. No

endobronchial masses.


Lungs: Left upper lobectomy. Redemonstrated 1.5 cm irregular groundglass opacity

at the right apex (axial image 28); 0.6 cm right upper lobe groundglass opacity 

(axial 41); 8 mm right middle lobe solid and groundglass nodule (axial 81); 

spiculated 9 mm solid and groundglass right middle lobe nodule (axial 87); right

middle lobe 8 mm spiculated solid and groundglass nodule (axial image 85). Areas

of consolidation redemonstrated in the right middle lobe. 4 mm and 5 mm nodules 

in the lateral aspect of the left lower lobe (axial 79). Lateral and medial left

lower lobe atelectasis/scarring.


Pleural space: There is no pneumothorax or pleural effusion.


Upper abdomen: Limited evaluation of the upper abdomen is unremarkable.


Osseous structures and soft tissues: Postsurgical changes of multiple left 

posterior ribs status post lobectomy.





Impression: 





1.  Acute bilateral pan lobar pulmonary emboli. Evidence for right heart strain.

Findings discussed with ordering provider Dr. Paulino with Dr. Luna at 

approximately 1245.


2.  Multiple bilateral pulmonary nodules redemonstrated for which additional 

sites of primary or metastatic disease remain within the differential.





VTE Prophylaxis Ordered


VTE Prophylaxis Devices:  No


VTE Pharmacological Prophylaxi:  Yes





Assessment/Plan


Assessment/Plan


Bilateral PEs


History of tongue cancer on chemotherapy





Plan:


Will admit patient on heparin drip


Consult to pulmonology


CTA there was obtained commented on evidence of right heart strain; will discuss

with pulmonology about possible thrombectomy.


Once patient more stable will transition to Eliquis


Resume home medications


Resume chemotherapy medications


FEN - Cardiac diet


PPX  - IV Heparin


FULL CODE


Dispo - inpatient for above; patient alexys his wife as surrogate decision-maker.


Advance Care Planning: Total time spent face-to-face with patient greater than 

16 minutes in discussion with goals of care, comfort care, end-of-life care, 

pain management, code status.


Critical care time 37 minutes including reviewing charts, reviewing imaging 

studies, bedside evaluations, discussion with Dr. Carias, and providing IV 

heparin.





Justifications for Admission


Other Justification














DALJIT PERLA MD            Apr 26, 2021 18:58

## 2021-04-26 NOTE — RAD
CTA CHEST



History: DYSPNEA, ADENOCARCINOMA OF LEFT LUNG



Comparison: CT chest 2/16/2021



Technique: CTA of the pulmonary arteries with intravenous contrast.



Findings:

Devices: Right chest Mediport with catheter tip terminating at the cavoatrial junction.

Pulmonary arteries: Multiple bilateral pulmonary emboli involving the right upper, right middle, righ
t lower and left lower lobar branches and extending distally. No evidence of right heart strain. The 
left upper lobe pulmonary artery is surgically absent status post left upper lobectomy.

Aorta and great vessels: No aneurysm of the aortic arch or thoracic aorta is seen.

Thyroid: No significant abnormalities.

Mediastinum and kostas: No mediastinal masses or adenopathy is seen.

Esophagus: The visualized esophagus is normal.

Heart: The heart is normal in size. There is no pericardial effusion.

Trachea: Left upper lobectomy changes mild central bronchial wall thickening. No endobronchial masses
.

Lungs: Left upper lobectomy. Redemonstrated 1.5 cm irregular groundglass opacity at the right apex (a
xial image 28); 0.6 cm right upper lobe groundglass opacity (axial 41); 8 mm right middle lobe solid 
and groundglass nodule (axial 81); spiculated 9 mm solid and groundglass right middle lobe nodule (ax
ial 87); right middle lobe 8 mm spiculated solid and groundglass nodule (axial image 85). Areas of co
nsolidation redemonstrated in the right middle lobe. 4 mm and 5 mm nodules in the lateral aspect of t
he left lower lobe (axial 79). Lateral and medial left lower lobe atelectasis/scarring.

Pleural space: There is no pneumothorax or pleural effusion.

Upper abdomen: Limited evaluation of the upper abdomen is unremarkable.

Osseous structures and soft tissues: Postsurgical changes of multiple left posterior ribs status post
 lobectomy.



Impression: 



1.  Acute bilateral pan lobar pulmonary emboli. Evidence for right heart strain. Findings discussed w
[facility] ordering provider Dr. Paulino with Dr. Luna at approximately 1245.

2.  Multiple bilateral pulmonary nodules redemonstrated for which additional sites of primary or meta
static disease remain within the differential.





**********FOR INTERNAL CODING PURPOSES**********



Critical result: Pulmonary emboli.



Findings discussed with  Dr. Paulino by Dr. Luna at 4/26/2021 12:45 PM.



RESULT CODE: (C)  





------

Exposure: One or more of the following individualized dose reduction techniques were utilized for thi
s examination:  

1. Automated exposure control

2. Adjustment of the mA and/or kV according to patient size

3. Use of iterative reconstruction technique.



Electronically signed by: Dilan Trujillo MD (4/26/2021 1:00 PM) [facility]

## 2021-04-27 VITALS — SYSTOLIC BLOOD PRESSURE: 128 MMHG | DIASTOLIC BLOOD PRESSURE: 87 MMHG

## 2021-04-27 VITALS — DIASTOLIC BLOOD PRESSURE: 71 MMHG | SYSTOLIC BLOOD PRESSURE: 131 MMHG

## 2021-04-27 VITALS — DIASTOLIC BLOOD PRESSURE: 95 MMHG | SYSTOLIC BLOOD PRESSURE: 141 MMHG

## 2021-04-27 VITALS — DIASTOLIC BLOOD PRESSURE: 86 MMHG | SYSTOLIC BLOOD PRESSURE: 122 MMHG

## 2021-04-27 VITALS — DIASTOLIC BLOOD PRESSURE: 89 MMHG | SYSTOLIC BLOOD PRESSURE: 132 MMHG

## 2021-04-27 VITALS — DIASTOLIC BLOOD PRESSURE: 85 MMHG | SYSTOLIC BLOOD PRESSURE: 130 MMHG

## 2021-04-27 RX ADMIN — METOPROLOL SUCCINATE SCH MG: 25 TABLET, EXTENDED RELEASE ORAL at 07:46

## 2021-04-27 RX ADMIN — PANTOPRAZOLE SODIUM SCH MG: 40 TABLET, DELAYED RELEASE ORAL at 07:45

## 2021-04-27 RX ADMIN — ATORVASTATIN CALCIUM SCH MG: 40 TABLET, FILM COATED ORAL at 20:51

## 2021-04-27 RX ADMIN — FAMOTIDINE SCH MG: 20 TABLET ORAL at 20:51

## 2021-04-27 RX ADMIN — HEPARIN SODIUM PRN MLS/HR: 10000 INJECTION, SOLUTION INTRAVENOUS at 14:10

## 2021-04-27 RX ADMIN — CETIRIZINE HYDROCHLORIDE SCH MG: 10 TABLET, FILM COATED ORAL at 07:46

## 2021-04-27 NOTE — CARD
MR#: [id number]

Account#: [id number]

Accession#: [id number]

Date of Study: 04/27/2021

Ordering Physician: GABBY LEHMAN, 

Referring Physician: GABBY LEHMAN, 

Tech: Jasmine Reyes, [facility]





--------------- APPROVED REPORT --------------





EXAM: Two-dimensional and M-mode echocardiogram with Doppler and color Doppler.



Other Information 

Quality : AverageHR: 86bpm



INDICATION

Dyspnea 

Pulmonary Embolism



RISK FACTORS

Hypertension 

Hyperlipidemia



2D DIMENSIONS 

Left Atrium(2D)3.3 (1.6-4.0cm)IVSd1.0 (0.7-1.1cm)

Aortic Root(2D)3.5 (2.0-3.7cm)LVDd4.5 (3.9-5.9cm)

LVOT Diameter2.1 (1.8-2.4cm)PWd1.3 (0.7-1.1cm)

LVDs2.9 (2.5-4.0cm)FS (%) 28.5 %

SV39.3 mlLVEF(%)55.6 (>50%)



Aortic Valve

AoV Peak Kyle.105.8cm/sAoV VTI24.0cm

AO Peak GR.4.5mmHgLVOT  VTI 16.65cm

AO Mean GR.4mmHg



Mitral Valve

MV E Gynudplm64.2cm/sMV DECEL [id number]

MV A Zuwekgda93.3cm/sE/A  Ratio0.8



TDI

Lateral E' P. V7.59cm/sMedial E' P. V6.24cm/s

E/Lateral E'8.2E/Medial E'10.0



Tricuspid Valve

TR P. Ysjbqxht011kn/sRAP [id number]

TR Peak Gr.01kaEcFSHX18myDm



Pulmonary Vein

S1 Erzldljt45.9cm/sS2 Krnwaayt07.83cm/s

D2 Cyshxzco79.8cm/sPVa [id number]



 LEFT VENTRICLE 

The left ventricle is normal size. There is borderline to mild concentric left ventricular hypertroph
y. The left ventricular systolic function is normal. The Ejection Fraction is 55%. There is normal LV
 segmental wall motion. Transmitral Doppler flow pattern is Grade I-abnormal relaxation pattern.



 RIGHT VENTRICLE 

The right ventricle is normal size. There is normal right ventricular wall thickness. The right ventr
icular systolic function is normal.



 ATRIA 

The left atrium size is normal. The right atrium size is normal. The interatrial septum is intact wit
h no evidence for an atrial septal defect or patent foramen ovale as noted on 2-D or Doppler imaging.




 AORTIC VALVE 

The aortic valve is normal in structure and function. Doppler and Color Flow revealed trace aortic re
gurgitation. There is no significant aortic valvular stenosis. Calculated aortic valve area is 2.29 c
m2 with maximum pressure gradient of 7 mmHg and mean pressure gradient of 5 mmHg.



 MITRAL VALVE 

The mitral valve is normal in structure and function. There is no evidence of mitral valve prolapse. 
There is no mitral valve stenosis. Doppler and Color Flow revealed no mitral valve regurgitation note
d.



 TRICUSPID VALVE 

The tricuspid valve is normal in structure and function. Doppler and Color Flow revealed trace tricus
pid regurgitation with an estimated PAP of 30 mmHg. There is no tricuspid valve stenosis.



 PULMONIC VALVE 

The pulmonic valve is not well visualized. Doppler and Color Flow revealed trace pulmonic valvular re
gurgitation.



 GREAT VESSELS 

The aortic root is normal in size. The IVC is normal in size and collapses >50% with inspiration.



 PERICARDIAL EFFUSION 

There is no evidence of significant pericardial effusion.



Critical Notification

Critical Value: No



<Conclusion>

The left ventricular systolic function is normal.

The Ejection Fraction is 55%.

There is normal LV segmental wall motion.

Transmitral Doppler flow pattern is Grade I-abnormal relaxation pattern.

Trace tricuspid regurgitation with an estimated PAP of 30 mmHg.

There is no evidence of significant pericardial effusion.



Signed by : Kang Hawkins, 

Electronically Approved : 04/27/2021 16:31:02

## 2021-04-27 NOTE — RAD
STUDY:  US BILATERAL LOWEREXTREMITY VENOUS DOPPLER 



INDICATION: PE, CALF PAIN



TECHNIQUE:  Color-flow and pulsed wave duplex ultrasound with compression of venous structures of the
 bilateral lower extremities.



COMPARISON: None Available.



FINDINGS: 

Heterogeneous echogenic material partially occludes the right peroneal vein and popliteal vein with i
ncomplete compressibility.



Duplex ultrasound with compression of the deep venous structures of the left from the common femoral 
vein through the popliteal vein is negative for DVT.

Incidental bilateral duplicated distal femoral veins.



IMPRESSION:

1. Right popliteal and peroneal vein deep venous thrombosis. Findings discussed with patient's nurse.
 Patient is receiving anticoagulation treatment for known pulmonary embolism.

2. No deep venous thrombosis of the left lower extremity.





**********FOR INTERNAL CODING PURPOSES**********



Critical result: Right lower extremity DVT.



Findings discussed with Nurse Castillo at 4/27/2021 12:15 PM.



RESULT CODE: (C) 



Electronically signed by: Dilan Trujillo MD (4/27/2021 12:17 PM) [facility]

## 2021-04-27 NOTE — NUR
SS following for discharge planning. SS reviewed pt chart and discussed with pt RN. Pt is 
from home with spouse and is currently on room air. Pulmonology consulted. Pt on Heparin 
drip. ECHO and Ultrasound ordered. SS will continue to follow for discharge planning.

## 2021-04-27 NOTE — PDOC
[employer] PROGRESS NOTE


Date of Service


DOS:


DATE: 4/27/21 


TIME: 12:23





Chief Complaint


Chief Complaint


Bilateral PE's





History of Present Illness


History of Present Illness


4/27/2021


Patient seen and examined. He is a physician and former colleague. 


Chart reviewed


Spoke to nursing and 


Discussed with the patient his history of pulmonary issues ultimately leading to

left sided lobectomy and incidental cancer finding


He is currently treating with chemotherapy


Spoke with pulmonology about this case


Patient asked if he could have some benadryl to help him sleep





HPI 4/26/2021


Is a 56-year-old male past medical history lung cancer, s/p lobectomy, currently

on chemotherapy, who presents to the ED at the behest of his PCP due to 

worsening shortness of breath.  Symptoms are aggravated with exertion.  Patient 

reports worsening shortness of breath over the past week.  He was seen by Dr. Paulino who ordered outpatient CTA today that showed bilateral PEs.  He was 

prescribed Eliquis today, but due to worsening shortness of breath he was 

referred to the ED for further evaluation.  Given the fact the patient had not 

yet initiated Eliquis I recommended IV heparin.  Will admit patient for further 

medical management.





Vitals/I&O


Vitals/I&O:





                                   Vital Signs








  Date Time  Temp Pulse Resp B/P (MAP) Pulse Ox O2 Delivery O2 Flow Rate FiO2


 


4/27/21 11:00 98.5 87 18 122/86 (98) 96 Room Air  





 98.5       














                                    I & O   


 


 4/26/21 4/26/21 4/27/21





 15:00 23:00 07:00


 


Intake Total   200 ml


 


Balance   200 ml











Physical Exam


General:  Alert, Oriented X3, Cooperative, No acute distress


Heart:  Regular rate


Lungs:  Clear


Abdomen:  Soft


Extremities:  No clubbing


Skin:  No rashes





Labs


Labs:





Laboratory Tests








Test


 4/26/21


18:28 4/27/21


00:40 4/27/21


05:50


 


White Blood Count


 4.3 x10^3/uL


(4.0-11.0) 


 





 


Red Blood Count


 4.09 x10^6/uL


(4.30-5.70) 


 





 


Hemoglobin


 12.3 g/dL


(13.0-17.5) 


 





 


Hematocrit


 36.4 %


(39.0-53.0) 


 





 


Mean Corpuscular Volume 89 fL ()   


 


Mean Corpuscular Hemoglobin 30 pg (25-35)   


 


Mean Corpuscular Hemoglobin


Concent 34 g/dL


(31-37) 


 





 


Red Cell Distribution Width


 16.5 %


(11.5-14.5) 


 





 


Platelet Count


 170 x10^3/uL


(140-400) 


 





 


Neutrophils (%) (Auto) 65 % (31-73)   


 


Lymphocytes (%) (Auto) 18 % (24-48)   


 


Monocytes (%) (Auto) 16 % (0-9)   


 


Eosinophils (%) (Auto) 1 % (0-3)   


 


Basophils (%) (Auto) 1 % (0-3)   


 


Neutrophils # (Auto)


 2.8 x10^3/uL


(1.8-7.7) 


 





 


Lymphocytes # (Auto)


 0.8 x10^3/uL


(1.0-4.8) 


 





 


Monocytes # (Auto)


 0.7 x10^3/uL


(0.0-1.1) 


 





 


Eosinophils # (Auto)


 0.0 x10^3/uL


(0.0-0.7) 


 





 


Basophils # (Auto)


 0.0 x10^3/uL


(0.0-0.2) 


 





 


Prothrombin Time


 14.7 SEC


(11.7-14.0) 


 





 


Prothromb Time International


Ratio 1.2 (0.8-1.1) 


 


 





 


Activated Partial


Thromboplast Time 31 SEC (24-38) 


 


 





 


Sodium Level


 139 mmol/L


(136-145) 


 





 


Potassium Level


 4.3 mmol/L


(3.5-5.1) 


 





 


Chloride Level


 103 mmol/L


() 


 





 


Carbon Dioxide Level


 28 mmol/L


(21-32) 


 





 


Anion Gap 8 (6-14)   


 


Blood Urea Nitrogen


 13 mg/dL


(8-26) 


 





 


Creatinine


 1.1 mg/dL


(0.7-1.3) 


 





 


Estimated GFR


(Cockcroft-Gault) 69.2 


 


 





 


BUN/Creatinine Ratio 12 (6-20)   


 


Glucose Level


 103 mg/dL


(70-99) 


 





 


Calcium Level


 8.5 mg/dL


(8.5-10.1) 


 





 


Magnesium Level


 1.9 mg/dL


(1.8-2.4) 


 





 


Total Bilirubin


 0.4 mg/dL


(0.2-1.0) 


 





 


Aspartate Amino Transf


(AST/SGOT) 17 U/L (15-37) 


 


 





 


Alanine Aminotransferase


(ALT/SGPT) 29 U/L (16-63) 


 


 





 


Alkaline Phosphatase


 109 U/L


() 


 





 


Troponin I Quantitative


 < 0.017 ng/mL


(0.000-0.055) 


 





 


NT-Pro-B-Type Natriuretic


Peptide 24 pg/mL


(0-124) 


 





 


Total Protein


 6.7 g/dL


(6.4-8.2) 


 





 


Albumin


 3.7 g/dL


(3.4-5.0) 


 





 


Albumin/Globulin Ratio 1.2 (1.0-1.7)   


 


Heparin Anti-Xa Act,


Unfractionated 


 > 1.10 IU/mL


(0.30-0.70) > 1.10 IU/mL


(0.30-0.70)











Assessment and Plan


Assessmemt and Plan





Assessment


Bilateral PEs


History of lung cancer on chemotherapy


Difficult sleeping





Plan


Currently on heparin drip, but may switch to Eliquis tomorrow per pulmonology


Benadryl as needed qhs for sleep


Awaiting further pulmonology input


Resume home meds


Resume chemotherapy meds


Cardiac monitoring


DVT prophylaxis


Full Code


Wife as surrogate decision maker


Appreciate subspecialist input





Comment


Review of Relevant


I have reviewed the following items aj (where applicable) has been applied.


Medications:





Current Medications








 Medications


  (Trade)  Dose


 Ordered  Sig/Brayden


 Route


 PRN Reason  Start Time


 Stop Time Status Last Admin


Dose Admin


 


 Heparin Sodium


  (Porcine)


  (Heparin Sodium)  7,900 unit  1X  ONCE


 IV


   4/26/21 18:15


 4/26/21 18:16 DC 4/26/21 18:45





 


 Heparin Sodium/


 Dextrose  250 ml @ 0


 mls/hr  CONT  PRN


 IV


 PER PROTOCOL  4/26/21 18:15


    4/26/21 18:45





 


 Acetaminophen/


 Hydrocodone Bitart


  (Lortab 5/325)  1 tab  PRN Q4HRS  PRN


 PO


 MILD PAIN 1-3  4/26/21 19:00


    4/26/21 20:34





 


 Acetaminophen


  (Tylenol)  650 mg  PRN Q6HRS  PRN


 PO


 Headaches, Temp > 101.5F  4/26/21 19:00


    4/27/21 07:45





 


 Atorvastatin


 Calcium


  (Lipitor)  40 mg  QHS


 PO


   4/26/21 21:00


    4/26/21 20:33





 


 Metoprolol


 Succinate


  (Toprol Xl)  25 mg  DAILY


 PO


   4/27/21 09:00


    4/27/21 07:46





 


 Pantoprazole


 Sodium


  (Protonix)  40 mg  DAILYAC


 PO


   4/27/21 07:30


    4/27/21 07:45





 


 Cetirizine HCl


  (ZyrTEC)  10 mg  DAILY


 PO


   4/27/21 09:00


    4/27/21 07:46














Justifications for Admission


Other Justification


Bilateral PEs











ISMAEL CADE III DO           Apr 27, 2021 12:32

## 2021-04-27 NOTE — CONS
DATE OF CONSULTATION: 04/27/2021

PULMONARY CONSULTATION



REASON FOR CONSULTATION:  Pulmonary embolism.



ATTENDING PHYSICIAN:  Raffi Bentley MD



HISTORY OF PRESENT ILLNESS:  The patient is a 56-year-old who is known to me and

follows me at the office.  The patient has a history of bronchial atresia for 

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  The patient was recently diagnosed with bronchoalveolar cell 

cancer.  It was an incidental finding after a left upper lobectomy.  The patient

is currently undergoing neoadjuvant chemo.



The patient had his chemo on 04/12 which included cisplatin and pemetrexed.  The

patient states that he started to have some right calf tenderness and then few 

days later, he started to have some shortness of breath.  There was no chest 

pain.  No syncopal episode.  No headaches.  No nausea or vomiting, no diarrhea, 

no dysuria.



The patient underwent a CT angiogram, which was reviewed by me.  There was 

evidence of extensive right-sided pulmonary embolism.  The patient had evidence 

of involvement of the distal right mainstem along with right upper, right middle

and right lower lobe branches.  There was redemonstration of 1.5 cm irregular 

ground-glass opacity at the right lung apex.  There was also a 6 mm right upper 

lobe ground-glass opacity and an 8 mm right middle lobe solid nodule and a 

ground-glass nodule.  There was 9 mm solid and ground glass right middle lobe 

nodule.  Overall, these findings have not changed significantly.  There was no 

evidence of any right ventricular strain.  I have been asked to see him for 

further evaluation.  He is hemodynamically stable.



PAST MEDICAL HISTORY:  Significant for history of bronchoalveolar cell cancer, 

diagnosed last year and has been treated with left upper lobectomy followed by 

neoadjuvant chemo.  No significant tobacco history or history of bronchial 

atresia.  History of hypertension, hyperlipidemia and GERD.



PAST SURGICAL HISTORY:  Left upper lobectomy.



FAMILY HISTORY:  Prostate cancer.



SOCIAL HISTORY:  Nonsmoker and nonalcoholic.



ALLERGIES:  None.



MEDICATIONS:  Reviewed as listed in the MRAD.



REVIEW OF SYSTEMS:  A 12-point system was obtained.  Pertinent positives 

discussed in my presence illness, otherwise, noncontributory.  All systems that 

were negative were reviewed as well.



PHYSICAL EXAMINATION:

VITAL SIGNS:  Reviewed.  Pulse ox 94% on room air.  He does have 

exercise-induced dyspnea.

NECK:  Supple.

LUNGS:  Clear.  His left upper lobectomy incision has healed well.

CARDIOVASCULAR:  Sounds are regular.

ABDOMEN:  Soft.

EXTREMITIES:  With some mild right calf tenderness.



LABORATORY DATA:  Reviewed.  White cell count 4.3, hemoglobin 12.3, and 

platelets are 170.  BUN 13 and creatinine 1.1.



IMPRESSION:

1.  Acute pulmonary embolism involving the right distal pulmonary artery, right 

upper lobe, right middle and right lower lobe pulmonary arteries.  There is also

left lower lobar branches involved as well.  There is no evidence of right heart

strain.  The risk factors for thromboembolic disease, likely combination of 

chemo (Cisplatinum/ Pemetrexed ) and less likely hypercoagulable state from 
malignancy.  The patient is hemodynamically

stable.  Troponin is also within normal limits.

2.  The patient has bronchoalveolar cell cancer, diagnosed last year as an 

incidental finding after his left upper lobectomy for bronchial atresia and 

persistent collapse, left upper lobe.

3.  Status post neoadjuvant chemo with cisplatin and pemetrexed.

4.  No significant tobacco history.

5.  History of Bronchial Atresia.



RECOMMENDATIONS:

1.  He will need minimum of 3-6 months of anticoagulation.  Continue with 

heparin.

2.  We will switch to Eliquis in the next 24-48 hours.

3.  We will obtain venous Dopplers of the lower extremities.

4.  We will obtain echocardiogram.

5.  We will discuss with Medical Oncology regarding any further continuation of 

current chemo as it may have predisposed to thromboembolic disease.

6.  We will need a repeat CTA chest and Dopplers in the next 4-6 weeks to assess

for resolution.

7.  We will follow along with you.



I discussed with Dr. Bentley.







GIOVANA/GENI MYERS: Efraín   DD: 04/27/2021 09:09

DT: 04/27/2021 09:42   TID: [id number]

[facility]

## 2021-04-28 VITALS — SYSTOLIC BLOOD PRESSURE: 110 MMHG | DIASTOLIC BLOOD PRESSURE: 74 MMHG

## 2021-04-28 VITALS — SYSTOLIC BLOOD PRESSURE: 123 MMHG | DIASTOLIC BLOOD PRESSURE: 85 MMHG

## 2021-04-28 VITALS — SYSTOLIC BLOOD PRESSURE: 120 MMHG | DIASTOLIC BLOOD PRESSURE: 86 MMHG

## 2021-04-28 VITALS — SYSTOLIC BLOOD PRESSURE: 132 MMHG | DIASTOLIC BLOOD PRESSURE: 83 MMHG

## 2021-04-28 VITALS — DIASTOLIC BLOOD PRESSURE: 76 MMHG | SYSTOLIC BLOOD PRESSURE: 112 MMHG

## 2021-04-28 VITALS — SYSTOLIC BLOOD PRESSURE: 131 MMHG | DIASTOLIC BLOOD PRESSURE: 86 MMHG

## 2021-04-28 LAB
ERYTHROCYTE [DISTWIDTH] IN BLOOD BY AUTOMATED COUNT: 16.7 % (ref 11.5–14.5)
HCT VFR BLD CALC: 34.6 % (ref 39–53)
HGB BLD-MCNC: 11.7 G/DL (ref 13–17.5)
MCH RBC QN AUTO: 30 PG (ref 25–35)
MCHC RBC AUTO-ENTMCNC: 34 G/DL (ref 31–37)
MCV RBC AUTO: 89 FL (ref 79–100)
PLATELET # BLD AUTO: 202 X10^3/UL (ref 140–400)
RBC # BLD AUTO: 3.87 X10^6/UL (ref 4.3–5.7)
WBC # BLD AUTO: 3.1 X10^3/UL (ref 4–11)

## 2021-04-28 RX ADMIN — PANTOPRAZOLE SODIUM SCH MG: 40 TABLET, DELAYED RELEASE ORAL at 07:26

## 2021-04-28 RX ADMIN — ATORVASTATIN CALCIUM SCH MG: 40 TABLET, FILM COATED ORAL at 20:33

## 2021-04-28 RX ADMIN — APIXABAN SCH MG: 5 TABLET, FILM COATED ORAL at 10:06

## 2021-04-28 RX ADMIN — APIXABAN SCH MG: 5 TABLET, FILM COATED ORAL at 20:32

## 2021-04-28 RX ADMIN — METOPROLOL SUCCINATE SCH MG: 25 TABLET, EXTENDED RELEASE ORAL at 08:53

## 2021-04-28 RX ADMIN — CETIRIZINE HYDROCHLORIDE SCH MG: 10 TABLET, FILM COATED ORAL at 08:52

## 2021-04-28 RX ADMIN — FAMOTIDINE SCH MG: 20 TABLET ORAL at 20:32

## 2021-04-28 NOTE — PDOC2
CONSULT


Date of Consult


Date of Consult


DATE: 4/28/21 


TIME: 08:28





Reason for Consult


Reason for Consult:


 PE


DVT


Lung ca





Identification/Chief Complaint


Chief Complaint


Dyspnea





Source


Source:  Chart review, Patient





History of Present Illness


Reason for Visit:


This is a 56-year-old man with known resected adenocarcinoma of the left upper 

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 [id number] disease. EGFR WT. patient is currently under the 

care of Dr. Lundberg.  He has received 3 cycles of cis-platinum and 

pemetrexed as adjuvant therapy with cycle 3 given on April 12 of 2021.  Last 

week presented with right lower extremity pain and ultimately progressive 

shortness of breath.  A CT angiogram was ordered and done on April 26 showing 

bilateral extensive pulmonary emboli.  Patient was then started on Eliquis and 

was instructed to report to the emergency room if her respiratory symptoms 

worsen.  Ultimately he was admitted to [facility] and was 

switched to IV heparin.  A venous Doppler of the lower extremities showed a 

right popliteal and peroneal vein thrombosis.  He is breathing well today on chino

m air.  Heparin has been stopped.  Eliquis will start today





Social History


No


ALCOHOL:  none


Drugs:  None





Current Problem List


Problem List


Problems


Medical Problems:


(1) Bilateral pulmonary embolism


Status: Acute  











Current Medications


Current Medications





Current Medications


Heparin Sodium (Porcine) (Heparin Sodium) 7,900 unit 1X  ONCE IV  Last 

administered on 4/26/21at 18:45;  Start 4/26/21 at 18:15;  Stop 4/26/21 at 

18:16;  Status DC


Heparin Sodium/ Dextrose 250 ml @ 0 mls/hr CONT  PRN IV PER PROTOCOL Last 

administered on 4/27/21at 14:10;  Start 4/26/21 at 18:15


Heparin Sodium (Porcine) (Heparin Sodium) 3,000 unit PRN Q6HRS  PRN IV FOR UFH 

LEVEL LESS THAN 0.2;  Start 4/26/21 at 18:15


Heparin Sodium (Porcine) (Heparin Sodium) 1,500 unit PRN Q6HRS  PRN IV FOR UFH 

LEVEL 0.2 - 0.29;  Start 4/26/21 at 18:15


Ondansetron HCl (Zofran) 4 mg PRN Q6HRS  PRN IVP NAUSEA/VOMITING;  Start 4/26/21

at 19:00


Al Hydroxide/Mg Hydroxide (Mylanta Plus Xs) 30 ml PRN Q3HRS  PRN PO HEARTBURN / 

GAS;  Start 4/26/21 at 19:00


Calcium Carbonate/ Glycine (Tums) 500 mg PRN Q3HRS  PRN PO UPSET STOMACH;  Start

4/26/21 at 19:00


Zolpidem Tartrate (Ambien) 5 mg PRN QHS  PRN PO INSOMNIA, MAY REPEAT IN 1HR;  

Start 4/26/21 at 19:00


Acetaminophen/ Hydrocodone Bitart (Lortab 5/325) 1 tab PRN Q4HRS  PRN PO MILD 

PAIN 1-3 Last administered on 4/26/21at 20:34;  Start 4/26/21 at 19:00


Acetaminophen (Tylenol) 650 mg PRN Q6HRS  PRN PO Headaches, Temp > 101.5F Last 

administered on 4/27/21at 07:45;  Start 4/26/21 at 19:00


Magnesium Hydroxide (Milk Of Magnesia) 2,400 mg PRN Q12HR  PRN PO CONSTIPATION; 

Start 4/26/21 at 19:00


Bisacodyl (Dulcolax Supp) 10 mg PRN DAILY  PRN TN CONSTIPATION;  Start 4/26/21 

at 19:00


Atorvastatin Calcium (Lipitor) 40 mg QHS PO  Last administered on 4/27/21at 

20:51;  Start 4/26/21 at 21:00


Metoprolol Succinate (Toprol Xl) 25 mg DAILY PO  Last administered on 4/27/21at 

07:46;  Start 4/27/21 at 09:00


Pantoprazole Sodium (Protonix) 40 mg DAILYAC PO  Last administered on 4/28/21at 

07:26;  Start 4/27/21 at 07:30


Famotidine (Pepcid) 40 mg DAILY PO ;  Start 4/27/21 at 09:00;  Stop 4/27/21 at 

07:51;  Status DC


Cetirizine HCl (ZyrTEC) 10 mg DAILY PO  Last administered on 4/27/21at 07:46;  

Start 4/27/21 at 09:00


Non-Formulary Medication (Rosuvastatin Calcium (Crestor)) 40 mg HS PO ;  Start 

4/26/21 at 21:00;  Stop 4/26/21 at 19:05;  Status DC


Famotidine (Pepcid) 40 mg HS PO  Last administered on 4/27/21at 20:51;  Start 

4/27/21 at 21:00


Diphenhydramine HCl (Benadryl Oral Elixir) 25 mg PRN QHS  ONCE PO ;  Start 

4/27/21 at 12:30;  Stop 4/27/21 at 12:31;  Status Cancel


Diphenhydramine HCl (Benadryl Oral Elixir) 25 mg 1X PRN  PRN PO SLEEP Last 

administered on 4/27/21at 23:19;  Start 4/27/21 at 23:15;  Stop 4/27/21 at 

23:19;  Status DC





Active Scripts


Active


Reported


Crestor (Rosuvastatin Calcium) 40 Mg Tablet 40 Mg PO HS


Protonix  ** (Pantoprazole Sodium) 40 Mg Tablet.dr 40 Mg PO DAILYAC


Folic Acid 0.8 Mg Tablet 0.8 Mg PO DAILY


B12 Active (Mecobalamin) 1,000 Mcg Tab.chew 2,500 Mcg PO DAILY


Metoprolol Succinate ( Xl ) (Metoprolol Succinate) 25 Mg Tab.er.24h 1 Tab PO 

DAILY


Claritin (Loratadine) 10 Mg Capsule 1 Cap PO DAILY 30 Days


Flonase Allergy Relief (Fluticasone Propionate) 9.9 Ml Spray.susp 2 Sprays NS 

DAILY





Allergies


Allergies:  


Coded Allergies:  


     No Known Drug Allergies (Unverified , 8/26/20)





Vitals


VITALS





Vital Signs








  Date Time  Temp Pulse Resp B/P (MAP) Pulse Ox O2 Delivery O2 Flow Rate FiO2


 


4/28/21 03:00 98.1 83 20 112/76 (88) 94 Room Air  





 98.1       











Labs


Labs





Laboratory Tests








Test


 4/26/21


18:28 4/27/21


00:40 4/27/21


05:50 4/27/21


12:10


 


White Blood Count


 4.3 x10^3/uL


(4.0-11.0) 


 


 





 


Red Blood Count


 4.09 x10^6/uL


(4.30-5.70) 


 


 





 


Hemoglobin


 12.3 g/dL


(13.0-17.5) 


 


 





 


Hematocrit


 36.4 %


(39.0-53.0) 


 


 





 


Mean Corpuscular Volume 89 fL ()    


 


Mean Corpuscular Hemoglobin 30 pg (25-35)    


 


Mean Corpuscular Hemoglobin


Concent 34 g/dL


(31-37) 


 


 





 


Red Cell Distribution Width


 16.5 %


(11.5-14.5) 


 


 





 


Platelet Count


 170 x10^3/uL


(140-400) 


 


 





 


Neutrophils (%) (Auto) 65 % (31-73)    


 


Lymphocytes (%) (Auto) 18 % (24-48)    


 


Monocytes (%) (Auto) 16 % (0-9)    


 


Eosinophils (%) (Auto) 1 % (0-3)    


 


Basophils (%) (Auto) 1 % (0-3)    


 


Neutrophils # (Auto)


 2.8 x10^3/uL


(1.8-7.7) 


 


 





 


Lymphocytes # (Auto)


 0.8 x10^3/uL


(1.0-4.8) 


 


 





 


Monocytes # (Auto)


 0.7 x10^3/uL


(0.0-1.1) 


 


 





 


Eosinophils # (Auto)


 0.0 x10^3/uL


(0.0-0.7) 


 


 





 


Basophils # (Auto)


 0.0 x10^3/uL


(0.0-0.2) 


 


 





 


Prothrombin Time


 14.7 SEC


(11.7-14.0) 


 


 





 


Prothromb Time International


Ratio 1.2 (0.8-1.1) 


 


 


 





 


Activated Partial


Thromboplast Time 31 SEC (24-38) 


 


 


 





 


Sodium Level


 139 mmol/L


(136-145) 


 


 





 


Potassium Level


 4.3 mmol/L


(3.5-5.1) 


 


 





 


Chloride Level


 103 mmol/L


() 


 


 





 


Carbon Dioxide Level


 28 mmol/L


(21-32) 


 


 





 


Anion Gap 8 (6-14)    


 


Blood Urea Nitrogen


 13 mg/dL


(8-26) 


 


 





 


Creatinine


 1.1 mg/dL


(0.7-1.3) 


 


 





 


Estimated GFR


(Cockcroft-Gault) 69.2 


 


 


 





 


BUN/Creatinine Ratio 12 (6-20)    


 


Glucose Level


 103 mg/dL


(70-99) 


 


 





 


Calcium Level


 8.5 mg/dL


(8.5-10.1) 


 


 





 


Magnesium Level


 1.9 mg/dL


(1.8-2.4) 


 


 





 


Total Bilirubin


 0.4 mg/dL


(0.2-1.0) 


 


 





 


Aspartate Amino Transf


(AST/SGOT) 17 U/L (15-37) 


 


 


 





 


Alanine Aminotransferase


(ALT/SGPT) 29 U/L (16-63) 


 


 


 





 


Alkaline Phosphatase


 109 U/L


() 


 


 





 


Troponin I Quantitative


 < 0.017 ng/mL


(0.000-0.055) 


 


 





 


NT-Pro-B-Type Natriuretic


Peptide 24 pg/mL


(0-124) 


 


 





 


Total Protein


 6.7 g/dL


(6.4-8.2) 


 


 





 


Albumin


 3.7 g/dL


(3.4-5.0) 


 


 





 


Albumin/Globulin Ratio 1.2 (1.0-1.7)    


 


Heparin Anti-Xa Act,


Unfractionated 


 > 1.10 IU/mL


(0.30-0.70) > 1.10 IU/mL


(0.30-0.70) 1.10 IU/mL


(0.30-0.70)


 


Test


 4/27/21


20:00 4/28/21


02:50 


 





 


Heparin Anti-Xa Act,


Unfractionated 0.64 IU/mL


(0.30-0.70) 0.49 IU/mL


(0.30-0.70) 


 





 


White Blood Count


 


 3.1 x10^3/uL


(4.0-11.0) 


 





 


Red Blood Count


 


 3.87 x10^6/uL


(4.30-5.70) 


 





 


Hemoglobin


 


 11.7 g/dL


(13.0-17.5) 


 





 


Hematocrit


 


 34.6 %


(39.0-53.0) 


 





 


Mean Corpuscular Volume  89 fL ()   


 


Mean Corpuscular Hemoglobin  30 pg (25-35)   


 


Mean Corpuscular Hemoglobin


Concent 


 34 g/dL


(31-37) 


 





 


Red Cell Distribution Width


 


 16.7 %


(11.5-14.5) 


 





 


Platelet Count


 


 202 x10^3/uL


(140-400) 


 











Laboratory Tests








Test


 4/27/21


12:10 4/27/21


20:00 4/28/21


02:50


 


Heparin Anti-Xa Act,


Unfractionated 1.10 IU/mL


(0.30-0.70) 0.64 IU/mL


(0.30-0.70) 0.49 IU/mL


(0.30-0.70)


 


White Blood Count


 


 


 3.1 x10^3/uL


(4.0-11.0)


 


Red Blood Count


 


 


 3.87 x10^6/uL


(4.30-5.70)


 


Hemoglobin


 


 


 11.7 g/dL


(13.0-17.5)


 


Hematocrit


 


 


 34.6 %


(39.0-53.0)


 


Mean Corpuscular Volume   89 fL () 


 


Mean Corpuscular Hemoglobin   30 pg (25-35) 


 


Mean Corpuscular Hemoglobin


Concent 


 


 34 g/dL


(31-37)


 


Red Cell Distribution Width


 


 


 16.7 %


(11.5-14.5)


 


Platelet Count


 


 


 202 x10^3/uL


(140-400)











Assessment/Plan


Assessment/Plan


1- Bilateral PE


2- RLE DVT


3- Lung adenocarcinoma. on adjuvant chemo. 








PLAN


1-would recommend 6 months of full dose anticoagulation.  If by then, he is in 

remission from his malignancy, he can be taken off.


2-with a discussion regarding pros and cons of proceeding with the last cycle of

chemotherapy has scheduled.  His blood clot is provoked.  However, now that he 

is on full dose anticoagulation, 1 more cycle of chemotherapy is unlikely to 

exacerbate his pulmonary emboli.  He will have a discussion with Dr. Lundberg

about this next week











KATE THOMAS MD                 Apr 28, 2021 08:33

## 2021-04-28 NOTE — PDOC
[facility] [facility] PROGRESS NOTE


Date of Service


DOS:


DATE: 4/28/21 


TIME: 09:47





Chief Complaint


Chief Complaint


acute Bilateral PE's,  prob provoked


SIRS on admit,   tachycardia and tachypnea without organ dysfunction


DVT right leg


lung cancer,   s/p lobeectomy





History of Present Illness


History of Present Illness


4/28, change to eliquis today,  try 6 min walk tomorrow,  still weak,  

tachycardia at rest, tachypnea with any exertion





4/27/2021


Patient seen and examined. He is a physician and former colleague. 


Chart reviewed


Spoke to nursing and 


Discussed with the patient his history of pulmonary issues ultimately leading to

left sided lobectomy and incidental cancer finding


He is currently treating with chemotherapy


Spoke with pulmonology about this case


Patient asked if he could have some benadryl to help him sleep





HPI 4/26/2021


Is a 56-year-old male past medical history lung cancer, s/p lobectomy, currently

on chemotherapy, who presents to the ED at the behest of his PCP due to 

worsening shortness of breath.  Symptoms are aggravated with exertion.  Patient 

reports worsening shortness of breath over the past week.  He was seen by Dr. Paulino who ordered outpatient CTA today that showed bilateral PEs.  He was 

prescribed Eliquis today, but due to worsening shortness of breath he was 

referred to the ED for further evaluation.  Given the fact the patient had not 

yet initiated Eliquis I recommended IV heparin.  Will admit patient for further 

medical management.





Vitals/I&O


Vitals/I&O:





                                   Vital Signs








  Date Time  Temp Pulse Resp B/P (MAP) Pulse Ox O2 Delivery O2 Flow Rate FiO2


 


4/28/21 08:53  83  112/76    


 


4/28/21 07:00 98.1  18  95 Room Air  





 98.1       














                                    I & O   


 


 4/27/21 4/27/21 4/28/21





 15:00 23:00 07:00


 


Intake Total 600 ml 800 ml 700 ml


 


Balance 600 ml 800 ml 700 ml











Physical Exam


General:  Alert, Oriented X3, Cooperative, No acute distress


Heart:  Regular rate


Lungs:  Clear


Abdomen:  Soft


Extremities:  No clubbing


Skin:  No rashes





Labs


Labs:





Laboratory Tests








Test


 4/27/21


12:10 4/27/21


20:00 4/28/21


02:50


 


Heparin Anti-Xa Act,


Unfractionated 1.10 IU/mL


(0.30-0.70) 0.64 IU/mL


(0.30-0.70) 0.49 IU/mL


(0.30-0.70)


 


White Blood Count


 


 


 3.1 x10^3/uL


(4.0-11.0)


 


Red Blood Count


 


 


 3.87 x10^6/uL


(4.30-5.70)


 


Hemoglobin


 


 


 11.7 g/dL


(13.0-17.5)


 


Hematocrit


 


 


 34.6 %


(39.0-53.0)


 


Mean Corpuscular Volume   89 fL () 


 


Mean Corpuscular Hemoglobin   30 pg (25-35) 


 


Mean Corpuscular Hemoglobin


Concent 


 


 34 g/dL


(31-37)


 


Red Cell Distribution Width


 


 


 16.7 %


(11.5-14.5)


 


Platelet Count


 


 


 202 x10^3/uL


(140-400)











Review of Systems


Review of Systems:


no n.v.d


feels better, still weak with any exertion





Assessment and Plan


Assessmemt and Plan


Problems


Medical Problems:


(1) Bilateral pulmonary embolism


Status: Acute  











Comment


Review of Relevant


I have reviewed the following items aj (where applicable) has been applied.


Medications:





Current Medications








 Medications


  (Trade)  Dose


 Ordered  Sig/Brayden


 Route


 PRN Reason  Start Time


 Stop Time Status Last Admin


Dose Admin


 


 Famotidine


  (Pepcid)  40 mg  HS


 PO


   4/27/21 21:00


    4/27/21 20:51





 


 Diphenhydramine


 HCl


  (Benadryl Oral


 Elixir)  25 mg  1X PRN  PRN


 PO


 SLEEP  4/27/21 23:15


 4/27/21 23:19 DC 4/27/21 23:19














Justifications for Admission


Other Justification


Bilateral PEs











DOROTA WALSH MD                 Apr 28, 2021 09:50

## 2021-04-28 NOTE — PDOC
PULMONARY PROGRESS NOTES


DATE: 4/28/21 


TIME: 10:49


Subjective


feeling better


Vitals





Vital Signs








  Date Time  Temp Pulse Resp B/P (MAP) Pulse Ox O2 Delivery O2 Flow Rate FiO2


 


4/28/21 08:53  83  112/76    


 


4/28/21 07:00 98.1  18  95 Room Air  





 98.1       








General:  Alert


Lungs:  Clear


Cardiovascular:  S1


Abdomen:  Soft


Neuro Exam:  Alert


Extremities:  No Edema


Skin:  Warm


Labs





Laboratory Tests








Test


 4/26/21


18:28 4/27/21


00:40 4/27/21


05:50 4/27/21


12:10


 


White Blood Count


 4.3 x10^3/uL


(4.0-11.0) 


 


 





 


Red Blood Count


 4.09 x10^6/uL


(4.30-5.70) 


 


 





 


Hemoglobin


 12.3 g/dL


(13.0-17.5) 


 


 





 


Hematocrit


 36.4 %


(39.0-53.0) 


 


 





 


Mean Corpuscular Volume 89 fL ()    


 


Mean Corpuscular Hemoglobin 30 pg (25-35)    


 


Mean Corpuscular Hemoglobin


Concent 34 g/dL


(31-37) 


 


 





 


Red Cell Distribution Width


 16.5 %


(11.5-14.5) 


 


 





 


Platelet Count


 170 x10^3/uL


(140-400) 


 


 





 


Neutrophils (%) (Auto) 65 % (31-73)    


 


Lymphocytes (%) (Auto) 18 % (24-48)    


 


Monocytes (%) (Auto) 16 % (0-9)    


 


Eosinophils (%) (Auto) 1 % (0-3)    


 


Basophils (%) (Auto) 1 % (0-3)    


 


Neutrophils # (Auto)


 2.8 x10^3/uL


(1.8-7.7) 


 


 





 


Lymphocytes # (Auto)


 0.8 x10^3/uL


(1.0-4.8) 


 


 





 


Monocytes # (Auto)


 0.7 x10^3/uL


(0.0-1.1) 


 


 





 


Eosinophils # (Auto)


 0.0 x10^3/uL


(0.0-0.7) 


 


 





 


Basophils # (Auto)


 0.0 x10^3/uL


(0.0-0.2) 


 


 





 


Prothrombin Time


 14.7 SEC


(11.7-14.0) 


 


 





 


Prothromb Time International


Ratio 1.2 (0.8-1.1) 


 


 


 





 


Activated Partial


Thromboplast Time 31 SEC (24-38) 


 


 


 





 


Sodium Level


 139 mmol/L


(136-145) 


 


 





 


Potassium Level


 4.3 mmol/L


(3.5-5.1) 


 


 





 


Chloride Level


 103 mmol/L


() 


 


 





 


Carbon Dioxide Level


 28 mmol/L


(21-32) 


 


 





 


Anion Gap 8 (6-14)    


 


Blood Urea Nitrogen


 13 mg/dL


(8-26) 


 


 





 


Creatinine


 1.1 mg/dL


(0.7-1.3) 


 


 





 


Estimated GFR


(Cockcroft-Gault) 69.2 


 


 


 





 


BUN/Creatinine Ratio 12 (6-20)    


 


Glucose Level


 103 mg/dL


(70-99) 


 


 





 


Calcium Level


 8.5 mg/dL


(8.5-10.1) 


 


 





 


Magnesium Level


 1.9 mg/dL


(1.8-2.4) 


 


 





 


Total Bilirubin


 0.4 mg/dL


(0.2-1.0) 


 


 





 


Aspartate Amino Transf


(AST/SGOT) 17 U/L (15-37) 


 


 


 





 


Alanine Aminotransferase


(ALT/SGPT) 29 U/L (16-63) 


 


 


 





 


Alkaline Phosphatase


 109 U/L


() 


 


 





 


Troponin I Quantitative


 < 0.017 ng/mL


(0.000-0.055) 


 


 





 


NT-Pro-B-Type Natriuretic


Peptide 24 pg/mL


(0-124) 


 


 





 


Total Protein


 6.7 g/dL


(6.4-8.2) 


 


 





 


Albumin


 3.7 g/dL


(3.4-5.0) 


 


 





 


Albumin/Globulin Ratio 1.2 (1.0-1.7)    


 


Heparin Anti-Xa Act,


Unfractionated 


 > 1.10 IU/mL


(0.30-0.70) > 1.10 IU/mL


(0.30-0.70) 1.10 IU/mL


(0.30-0.70)


 


Test


 4/27/21


20:00 4/28/21


02:50 


 





 


Heparin Anti-Xa Act,


Unfractionated 0.64 IU/mL


(0.30-0.70) 0.49 IU/mL


(0.30-0.70) 


 





 


White Blood Count


 


 3.1 x10^3/uL


(4.0-11.0) 


 





 


Red Blood Count


 


 3.87 x10^6/uL


(4.30-5.70) 


 





 


Hemoglobin


 


 11.7 g/dL


(13.0-17.5) 


 





 


Hematocrit


 


 34.6 %


(39.0-53.0) 


 





 


Mean Corpuscular Volume  89 fL ()   


 


Mean Corpuscular Hemoglobin  30 pg (25-35)   


 


Mean Corpuscular Hemoglobin


Concent 


 34 g/dL


(31-37) 


 





 


Red Cell Distribution Width


 


 16.7 %


(11.5-14.5) 


 





 


Platelet Count


 


 202 x10^3/uL


(140-400) 


 











Laboratory Tests








Test


 4/27/21


12:10 4/27/21


20:00 4/28/21


02:50


 


Heparin Anti-Xa Act,


Unfractionated 1.10 IU/mL


(0.30-0.70) 0.64 IU/mL


(0.30-0.70) 0.49 IU/mL


(0.30-0.70)


 


White Blood Count


 


 


 3.1 x10^3/uL


(4.0-11.0)


 


Red Blood Count


 


 


 3.87 x10^6/uL


(4.30-5.70)


 


Hemoglobin


 


 


 11.7 g/dL


(13.0-17.5)


 


Hematocrit


 


 


 34.6 %


(39.0-53.0)


 


Mean Corpuscular Volume   89 fL () 


 


Mean Corpuscular Hemoglobin   30 pg (25-35) 


 


Mean Corpuscular Hemoglobin


Concent 


 


 34 g/dL


(31-37)


 


Red Cell Distribution Width


 


 


 16.7 %


(11.5-14.5)


 


Platelet Count


 


 


 202 x10^3/uL


(140-400)








Medications





Active Scripts








 Medications  Dose


 Route/Sig


 Max Daily Dose Days Date Category


 


 Crestor


  (Rosuvastatin


 Calcium) 40 Mg


 Tablet  40 Mg


 PO HS


   2/16/21 Reported


 


 Protonix  **


  (Pantoprazole


 Sodium) 40 Mg


 Tablet.dr  40 Mg


 PO DAILYAC


   2/16/21 Reported


 


 Folic Acid 0.8 Mg


 Tablet  0.8 Mg


 PO DAILY


   2/9/21 Reported


 


 B12 Active


  (Mecobalamin)


 1,000 Mcg Tab.chew  2,500 Mcg


 PO DAILY


   2/9/21 Reported


 


 Metoprolol


 Succinate ( Xl )


  (Metoprolol


 Succinate) 25 Mg


 Tab.er.24h  1 Tab


 PO DAILY


   2/9/21 Reported


 


 Claritin


  (Loratadine) 10


 Mg Capsule  1 Cap


 PO DAILY


  30 2/9/21 Reported


 


 Flonase Allergy


 Relief


  (Fluticasone


 Propionate) 9.9


 Ml Spray.susp  2 Sprays


 NS DAILY


   3/5/20 Reported











Impression


.


1.  Acute pulmonary embolism involving the right distal pulmonary artery, right 


upper lobe, right middle and right lower lobe pulmonary arteries.  There is also


left lower lobar branches involved as well.  There is no evidence of right heart


strain.  The risk factors for thromboembolic disease, likely combination of 


chemo (Cisplatinum/ Pemetrexed ) and less likely hypercoagulable state from 

malignancy.  The patient is hemodynamically


stable.  Troponin is also within normal limits.


2.  The patient has bronchoalveolar cell cancer, diagnosed last year as an 


incidental finding after his left upper lobectomy for bronchial atresia and 


persistent collapse, left upper lobe.


3.  Status post neoadjuvant chemo with cisplatin and pemetrexed.


4.  No significant tobacco history.


5.  History of Bronchial Atresia.





Plan


.





1.  He will need minimum of 3-6 months of anticoagulation.  Continue with 


heparin.


2.  We will switch to Eliquis today


3.  venous Dopplers positive for DVT right


4.  echocardiogram.reviewed


5.  We will discuss with Medical Oncology regarding any further continuation of 


current chemo as it may have predisposed to thromboembolic disease.


6.  We will need a repeat CTA chest and Dopplers in the next 4-6 weeks to assess


for resolution.


7.  We will follow along with you.





likely dc in am with 6 min walk











GABBY LEHMAN MD                 Apr 28, 2021 10:51

## 2021-04-28 NOTE — NUR
SS following up with discharge planning. SS reviewed pt chart and discussed with pt RN. Pt 
is currently on room air. Oncology consulted. Heparin drip discontinued. Pt on Eliquis now. 
Six minute walk ordered. Discharge plan is to home when medically ready. SS will continue to 
follow for discharge planning.

## 2021-04-29 VITALS
SYSTOLIC BLOOD PRESSURE: 131 MMHG | SYSTOLIC BLOOD PRESSURE: 131 MMHG | DIASTOLIC BLOOD PRESSURE: 96 MMHG | SYSTOLIC BLOOD PRESSURE: 131 MMHG | SYSTOLIC BLOOD PRESSURE: 131 MMHG | SYSTOLIC BLOOD PRESSURE: 131 MMHG | DIASTOLIC BLOOD PRESSURE: 96 MMHG | DIASTOLIC BLOOD PRESSURE: 96 MMHG | DIASTOLIC BLOOD PRESSURE: 96 MMHG | DIASTOLIC BLOOD PRESSURE: 96 MMHG | SYSTOLIC BLOOD PRESSURE: 131 MMHG | DIASTOLIC BLOOD PRESSURE: 96 MMHG | DIASTOLIC BLOOD PRESSURE: 96 MMHG | DIASTOLIC BLOOD PRESSURE: 96 MMHG | SYSTOLIC BLOOD PRESSURE: 131 MMHG | DIASTOLIC BLOOD PRESSURE: 96 MMHG | SYSTOLIC BLOOD PRESSURE: 131 MMHG | SYSTOLIC BLOOD PRESSURE: 131 MMHG | DIASTOLIC BLOOD PRESSURE: 96 MMHG | SYSTOLIC BLOOD PRESSURE: 131 MMHG | SYSTOLIC BLOOD PRESSURE: 131 MMHG | DIASTOLIC BLOOD PRESSURE: 96 MMHG | SYSTOLIC BLOOD PRESSURE: 131 MMHG | DIASTOLIC BLOOD PRESSURE: 96 MMHG

## 2021-04-29 VITALS — SYSTOLIC BLOOD PRESSURE: 131 MMHG | DIASTOLIC BLOOD PRESSURE: 96 MMHG

## 2021-04-29 RX ADMIN — METOPROLOL SUCCINATE SCH MG: 25 TABLET, EXTENDED RELEASE ORAL at 08:41

## 2021-04-29 RX ADMIN — PANTOPRAZOLE SODIUM SCH MG: 40 TABLET, DELAYED RELEASE ORAL at 08:39

## 2021-04-29 RX ADMIN — APIXABAN SCH MG: 5 TABLET, FILM COATED ORAL at 08:39

## 2021-04-29 RX ADMIN — CETIRIZINE HYDROCHLORIDE SCH MG: 10 TABLET, FILM COATED ORAL at 08:39

## 2021-04-29 NOTE — PDOC
PULMONARY PROGRESS NOTES


DATE: 4/29/21 


TIME: 11:37


Subjective


no overnight issues 


remains on room air


Vitals





Vital Signs








  Date Time  Temp Pulse Resp B/P (MAP) Pulse Ox O2 Delivery O2 Flow Rate FiO2


 


4/29/21 08:41  93  131/96    


 


4/29/21 07:43      Room Air  


 


4/29/21 07:00 97.5  20  95   





 97.5       








ROS:  No Nausea, No Chest Pain, No Abdominal Pain, No Increase Cough


General:  Alert


Lungs:  Clear


Cardiovascular:  S1


Abdomen:  Soft, Non-tender


Neuro Exam:  Alert, Oriented


Extremities:  No Edema


Skin:  Warm, Dry


Labs





Laboratory Tests








Test


 4/27/21


12:10 4/27/21


20:00 4/28/21


02:50 4/29/21


04:50


 


Heparin Anti-Xa Act,


Unfractionated 1.10 IU/mL


(0.30-0.70) 0.64 IU/mL


(0.30-0.70) 0.49 IU/mL


(0.30-0.70) > 1.10 IU/mL


(0.30-0.70)


 


White Blood Count


 


 


 3.1 x10^3/uL


(4.0-11.0) 





 


Red Blood Count


 


 


 3.87 x10^6/uL


(4.30-5.70) 





 


Hemoglobin


 


 


 11.7 g/dL


(13.0-17.5) 





 


Hematocrit


 


 


 34.6 %


(39.0-53.0) 





 


Mean Corpuscular Volume   89 fL ()  


 


Mean Corpuscular Hemoglobin   30 pg (25-35)  


 


Mean Corpuscular Hemoglobin


Concent 


 


 34 g/dL


(31-37) 





 


Red Cell Distribution Width


 


 


 16.7 %


(11.5-14.5) 





 


Platelet Count


 


 


 202 x10^3/uL


(140-400) 











Laboratory Tests








Test


 4/29/21


04:50


 


Heparin Anti-Xa Act,


Unfractionated > 1.10 IU/mL


(0.30-0.70)








Medications





Active Scripts








 Medications  Dose


 Route/Sig


 Max Daily Dose Days Date Category


 


 Crestor


  (Rosuvastatin


 Calcium) 40 Mg


 Tablet  40 Mg


 PO HS


   2/16/21 Reported


 


 Protonix  **


  (Pantoprazole


 Sodium) 40 Mg


 Tablet.dr  40 Mg


 PO DAILYAC


   2/16/21 Reported


 


 Folic Acid 0.8 Mg


 Tablet  0.8 Mg


 PO DAILY


   2/9/21 Reported


 


 B12 Active


  (Mecobalamin)


 1,000 Mcg Tab.chew  2,500 Mcg


 PO DAILY


   2/9/21 Reported


 


 Metoprolol


 Succinate ( Xl )


  (Metoprolol


 Succinate) 25 Mg


 Tab.er.24h  1 Tab


 PO DAILY


   2/9/21 Reported


 


 Claritin


  (Loratadine) 10


 Mg Capsule  1 Cap


 PO DAILY


  30 2/9/21 Reported


 


 Flonase Allergy


 Relief


  (Fluticasone


 Propionate) 9.9


 Ml Spray.susp  2 Sprays


 NS DAILY


   3/5/20 Reported











Impression


.


1.  Acute pulmonary embolism involving the right distal pulmonary artery, right 


upper lobe, right middle and right lower lobe pulmonary arteries.  There is also


left lower lobar branches involved as well.  There is no evidence of right heart


strain.  The risk factors for thromboembolic disease, likely combination of 


chemo (Cisplatinum/ Pemetrexed ) and less likely hypercoagulable state from 

malignancy.  The patient is hemodynamically


stable.  Troponin is also within normal limits.


2.  The patient has bronchoalveolar cell cancer, diagnosed last year as an 


incidental finding after his left upper lobectomy for bronchial atresia and 


persistent collapse, left upper lobe.


3.  Status post neoadjuvant chemo with cisplatin and pemetrexed.


4.  No significant tobacco history.


5.  History of Bronchial Atresia.





Plan


.


completed 6 min walk no need for home oxygen 


He will need minimum of 3-6 months of anticoagulation.  


continue Eliquis 


Venous Dopplers positive for DVT right


Echocardiogram.reviewed


Follow Medical Oncology recs  regarding any further continuation of  current 

chemo as it may have predisposed to thromboembolic disease.


Scheduled for Follow 7/14/21 with repeat CTA chest and Dopplers in the next 4-6 

weeks to assess for resolution.


Ok to DC home today from our standpoint 


D/W GABBY ROY MD                 Apr 29, 2021 11:39

## 2021-04-29 NOTE — PDOC3
Discharge Summary


Visit Information


Date of Admission:  Apr 26, 2021


Date of Discharge:  Apr 29, 2021


Final Diagnosis


acute Bilateral PE's,  prob provoked


SIRS on admit,   tachycardia and tachypnea without organ dysfunction


DVT right leg


lung cancer,   s/p lobeectomy


 


  





Problems


Medical Problems:


(1) Bilateral pulmonary embolism


Status: Acute  








Brief Hospital Course


Allergies





                                    Allergies








Coded Allergies Type Severity Reaction Last Updated Verified


 


  No Known Drug Allergies    8/26/20 No








Vital Signs





Vital Signs








  Date Time  Temp Pulse Resp B/P (MAP) Pulse Ox O2 Delivery O2 Flow Rate FiO2


 


4/29/21 08:41  93  131/96    


 


4/29/21 07:43      Room Air  


 


4/29/21 07:00 97.5  20  95   





 97.5       








Lab Results





Laboratory Tests








Test


 4/27/21


12:10 4/27/21


20:00 4/28/21


02:50 4/29/21


04:50


 


Heparin Anti-Xa Act,


Unfractionated 1.10 IU/mL


(0.30-0.70) 0.64 IU/mL


(0.30-0.70) 0.49 IU/mL


(0.30-0.70) > 1.10 IU/mL


(0.30-0.70)


 


White Blood Count


 


 


 3.1 x10^3/uL


(4.0-11.0) 





 


Red Blood Count


 


 


 3.87 x10^6/uL


(4.30-5.70) 





 


Hemoglobin


 


 


 11.7 g/dL


(13.0-17.5) 





 


Hematocrit


 


 


 34.6 %


(39.0-53.0) 





 


Mean Corpuscular Volume   89 fL ()  


 


Mean Corpuscular Hemoglobin   30 pg (25-35)  


 


Mean Corpuscular Hemoglobin


Concent 


 


 34 g/dL


(31-37) 





 


Red Cell Distribution Width


 


 


 16.7 %


(11.5-14.5) 





 


Platelet Count


 


 


 202 x10^3/uL


(140-400) 











Laboratory Tests








Test


 4/29/21


04:50


 


Heparin Anti-Xa Act,


Unfractionated > 1.10 IU/mL


(0.30-0.70)








Brief Hospital Course


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 

currently on chemotherapy,  admitted  due to worsening shortness of breath.  

[place] PE,  tried to start eliquis outpatient and declined


DVT seen in right leg also


Heparin gtt started,  symptoms inmproved over 2 days,  echo OK,  CT was 

concerning for strain,  better





 f/u Dr. Paulino, primary and Dr. Meeks, Pulm


 will do Eliqius 10 BID for 6.5 days, then 5 BID





Discharge Information


Condition at Discharge:  Improved


Follow Up:  Weeks


Disposition/Orders:  D/C to Home


Scheduled


Apixaban (Eliquis) 5 Mg Tablet, 5 MG PO BID for PE, #60 Ref 3


   already given, 


   Prescribed by: DOROTA WALSH on 4/29/21 1055


Fluticasone Propionate (Flonase Allergy Relief) 9.9 Ml Elizabeth.susp, 2 SPRAYS NS 

DAILY, (Reported)


   Entered as Reported by: MOI ANDERSON on 3/5/20 1400


   Last Action: Reviewed on 4/26/21 2032 by MARIO CARCAMO RN


Folic Acid (Folic Acid) 0.8 Mg Tablet, 0.8 MG PO DAILY for SUPPLEMENT, 

(Reported)


   Entered as Reported by: ANDREW HARRIS on 2/9/21 0749


   Last Action: Reviewed on 4/26/21 2032 by MARIO CARCAMO RN


Loratadine (Claritin) 10 Mg Capsule, 1 CAP PO DAILY for allergy symptoms for 30 

Days, #30 Ref 0 (Reported)


   Entered as Reported by: ANDREW HARRIS on 2/9/21 0749


   Last Action: Reviewed on 4/26/21 2032 by MARIO CARCAMO RN


Mecobalamin (B12 Active) 1,000 Mcg Tab.chew, 2,500 MCG PO DAILY for supplement, 

(Reported)


   Entered as Reported by: ANDREW HARRIS on 2/9/21 0749


   Last Action: Reviewed on 4/26/21 2032 by MARIO CARCAMO RN


Metoprolol Succinate (Metoprolol Succinate ( Xl )) 25 Mg Tab.er.24h, 1 TAB PO 

DAILY for heart, #30 Ref 5 (Reported)


   Entered as Reported by: ANDREW HARRIS on 2/9/21 0749


   Last Action: Reviewed on 4/26/21 2032 by MARIO CARCAMO RN


Pantoprazole Sodium (Protonix  **) 40 Mg Tablet.dr, 40 MG PO DAILYAC for GERD, 

(Reported)


   Entered as Reported by: Whit Haider on 2/16/21 0943


   Last Action: Reviewed on 4/26/21 2032 by MARIO CARCAMO RN


Rosuvastatin Calcium (Crestor) 40 Mg Tablet, 40 MG PO HS for FOR CHOLESTEROL, 

#30 Ref 0 (Reported)


   Entered as Reported by: Whit Haider on 2/16/21 0944


   Last Action: Reviewed on 4/26/21 2032 by MARIO ACRCAMO RN





Patient Instructions


Patient Instructions


pt seen face to face


he looked excelllent on his 6 min walk





Justicifation of Admission Dx:


Justifications for Admission:


Justification of Admission Dx:  Yes (acute PE)











DOROTA WALSH MD                 Apr 29, 2021 11:15

## 2021-04-29 NOTE — NUR
SS following up with discharge planning. SS reviewed pt chart and discussed with pt RN. Pt 
is currently on room air. Pt passed six minute walk. Discharge order on the chart for home 
with self care.

## 2021-04-29 NOTE — NUR
Discharge Note:



MALIA WINKLER [place]



Discharge instructions and discharge home medications reviewed with Patient and a copy 
given. All questions have been answered and understanding verbalized. Patient given 
information on follow up appointments. All belongings taken with patient upon discharge. 



The following instructions and handouts were given: Pulmonary Embolus and Eliquis



Discontinued lines and drains: Peripheral IV intact.



Patient discharged to Home or Self Care with Spouse via Ambulated

## 2021-05-03 ENCOUNTER — HOSPITAL ENCOUNTER (OUTPATIENT)
Dept: HOSPITAL 61 - ONCLAB | Age: 56
End: 2021-05-03
Attending: INTERNAL MEDICINE
Payer: COMMERCIAL

## 2021-05-03 DIAGNOSIS — C34.12: Primary | ICD-10-CM

## 2021-05-03 LAB
% LYMPHS: 39 % (ref 24–48)
% MONOS: 18 % (ref 0–10)
% SEGS: 41 % (ref 35–66)
ALBUMIN SERPL-MCNC: 3.6 G/DL (ref 3.4–5)
ALBUMIN/GLOB SERPL: 1.2 {RATIO} (ref 1–1.7)
ALP SERPL-CCNC: 109 U/L (ref 46–116)
ALT SERPL-CCNC: 28 U/L (ref 16–63)
ANION GAP SERPL CALC-SCNC: 12 MMOL/L (ref 6–14)
AST SERPL-CCNC: 21 U/L (ref 15–37)
BASOPHILS # BLD AUTO: 0 X10^3/UL (ref 0–0.2)
BASOPHILS NFR BLD: 1 % (ref 0–3)
BILIRUB SERPL-MCNC: 0.2 MG/DL (ref 0.2–1)
BUN SERPL-MCNC: 15 MG/DL (ref 8–26)
BUN/CREAT SERPL: 14 (ref 6–20)
CALCIUM SERPL-MCNC: 8.2 MG/DL (ref 8.5–10.1)
CHLORIDE SERPL-SCNC: 105 MMOL/L (ref 98–107)
CO2 SERPL-SCNC: 26 MMOL/L (ref 21–32)
CREAT SERPL-MCNC: 1.1 MG/DL (ref 0.7–1.3)
EOSINOPHIL NFR BLD AUTO: 2 % (ref 0–5)
EOSINOPHIL NFR BLD: 0.1 X10^3/UL (ref 0–0.7)
EOSINOPHIL NFR BLD: 3 % (ref 0–3)
ERYTHROCYTE [DISTWIDTH] IN BLOOD BY AUTOMATED COUNT: 17.7 % (ref 11.5–14.5)
GFR SERPLBLD BASED ON 1.73 SQ M-ARVRAT: 69.2 ML/MIN
GLUCOSE SERPL-MCNC: 156 MG/DL (ref 70–99)
HCT VFR BLD CALC: 36.3 % (ref 39–53)
HGB BLD-MCNC: 12.2 G/DL (ref 13–17.5)
LYMPHOCYTES # BLD: 0.9 X10^3/UL (ref 1–4.8)
LYMPHOCYTES NFR BLD AUTO: 29 % (ref 24–48)
MCH RBC QN AUTO: 31 PG (ref 25–35)
MCHC RBC AUTO-ENTMCNC: 34 G/DL (ref 31–37)
MCV RBC AUTO: 91 FL (ref 79–100)
MONO #: 0.6 X10^3/UL (ref 0–1.1)
MONOCYTES NFR BLD: 18 % (ref 0–9)
NEUT #: 1.6 X10^3/UL (ref 1.8–7.7)
NEUTROPHILS NFR BLD AUTO: 49 % (ref 31–73)
NRBC # BLD MANUAL: 1 10*3/UL
PLATELET # BLD AUTO: 303 X10^3/UL (ref 140–400)
PLATELET # BLD EST: ADEQUATE 10*3/UL
POTASSIUM SERPL-SCNC: 4.2 MMOL/L (ref 3.5–5.1)
PROT SERPL-MCNC: 6.7 G/DL (ref 6.4–8.2)
RBC # BLD AUTO: 4 X10^6/UL (ref 4.3–5.7)
SODIUM SERPL-SCNC: 143 MMOL/L (ref 136–145)
WBC # BLD AUTO: 3.2 X10^3/UL (ref 4–11)

## 2021-05-03 PROCEDURE — 85025 COMPLETE CBC W/AUTO DIFF WBC: CPT

## 2021-05-03 PROCEDURE — 80053 COMPREHEN METABOLIC PANEL: CPT

## 2021-05-03 PROCEDURE — 85007 BL SMEAR W/DIFF WBC COUNT: CPT

## 2021-05-03 PROCEDURE — 36415 COLL VENOUS BLD VENIPUNCTURE: CPT

## 2021-05-07 ENCOUNTER — HOSPITAL ENCOUNTER (OUTPATIENT)
Dept: HOSPITAL 61 - ONCLAB | Age: 56
End: 2021-05-07
Attending: INTERNAL MEDICINE
Payer: COMMERCIAL

## 2021-05-07 DIAGNOSIS — C34.12: Primary | ICD-10-CM

## 2021-05-07 LAB
ANION GAP SERPL CALC-SCNC: 10 MMOL/L (ref 6–14)
BASOPHILS # BLD AUTO: 0 X10^3/UL (ref 0–0.2)
BASOPHILS NFR BLD: 0 % (ref 0–3)
BUN SERPL-MCNC: 27 MG/DL (ref 8–26)
CALCIUM SERPL-MCNC: 8.3 MG/DL (ref 8.5–10.1)
CHLORIDE SERPL-SCNC: 102 MMOL/L (ref 98–107)
CO2 SERPL-SCNC: 28 MMOL/L (ref 21–32)
CREAT SERPL-MCNC: 1.1 MG/DL (ref 0.7–1.3)
EOSINOPHIL NFR BLD: 0 X10^3/UL (ref 0–0.7)
EOSINOPHIL NFR BLD: 1 % (ref 0–3)
ERYTHROCYTE [DISTWIDTH] IN BLOOD BY AUTOMATED COUNT: 17.9 % (ref 11.5–14.5)
GFR SERPLBLD BASED ON 1.73 SQ M-ARVRAT: 69.2 ML/MIN
GLUCOSE SERPL-MCNC: 186 MG/DL (ref 70–99)
HCT VFR BLD CALC: 35.7 % (ref 39–53)
HGB BLD-MCNC: 12.1 G/DL (ref 13–17.5)
LYMPHOCYTES # BLD: 1 X10^3/UL (ref 1–4.8)
LYMPHOCYTES NFR BLD AUTO: 18 % (ref 24–48)
MCH RBC QN AUTO: 31 PG (ref 25–35)
MCHC RBC AUTO-ENTMCNC: 34 G/DL (ref 31–37)
MCV RBC AUTO: 90 FL (ref 79–100)
MONO #: 0.4 X10^3/UL (ref 0–1.1)
MONOCYTES NFR BLD: 7 % (ref 0–9)
NEUT #: 4 X10^3/UL (ref 1.8–7.7)
NEUTROPHILS NFR BLD AUTO: 74 % (ref 31–73)
PLATELET # BLD AUTO: 265 X10^3/UL (ref 140–400)
POTASSIUM SERPL-SCNC: 3.7 MMOL/L (ref 3.5–5.1)
RBC # BLD AUTO: 3.96 X10^6/UL (ref 4.3–5.7)
SODIUM SERPL-SCNC: 140 MMOL/L (ref 136–145)
WBC # BLD AUTO: 5.4 X10^3/UL (ref 4–11)

## 2021-05-07 PROCEDURE — 80048 BASIC METABOLIC PNL TOTAL CA: CPT

## 2021-05-07 PROCEDURE — 36415 COLL VENOUS BLD VENIPUNCTURE: CPT

## 2021-05-07 PROCEDURE — 85025 COMPLETE CBC W/AUTO DIFF WBC: CPT

## 2021-05-10 ENCOUNTER — HOSPITAL ENCOUNTER (OUTPATIENT)
Dept: HOSPITAL 61 - ONCLAB | Age: 56
End: 2021-05-10
Attending: INTERNAL MEDICINE
Payer: COMMERCIAL

## 2021-05-10 DIAGNOSIS — C34.12: Primary | ICD-10-CM

## 2021-05-10 LAB
ALBUMIN SERPL-MCNC: 3.5 G/DL (ref 3.4–5)
ALBUMIN/GLOB SERPL: 1.1 {RATIO} (ref 1–1.7)
ALP SERPL-CCNC: 95 U/L (ref 46–116)
ALT SERPL-CCNC: 24 U/L (ref 16–63)
ANION GAP SERPL CALC-SCNC: 9 MMOL/L (ref 6–14)
AST SERPL-CCNC: 13 U/L (ref 15–37)
BASOPHILS # BLD AUTO: 0 X10^3/UL (ref 0–0.2)
BASOPHILS NFR BLD: 0 % (ref 0–3)
BILIRUB SERPL-MCNC: 0.2 MG/DL (ref 0.2–1)
BUN SERPL-MCNC: 24 MG/DL (ref 8–26)
BUN/CREAT SERPL: 18 (ref 6–20)
CALCIUM SERPL-MCNC: 8.8 MG/DL (ref 8.5–10.1)
CHLORIDE SERPL-SCNC: 102 MMOL/L (ref 98–107)
CO2 SERPL-SCNC: 27 MMOL/L (ref 21–32)
CREAT SERPL-MCNC: 1.3 MG/DL (ref 0.7–1.3)
EOSINOPHIL NFR BLD: 0 X10^3/UL (ref 0–0.7)
EOSINOPHIL NFR BLD: 1 % (ref 0–3)
ERYTHROCYTE [DISTWIDTH] IN BLOOD BY AUTOMATED COUNT: 18 % (ref 11.5–14.5)
GFR SERPLBLD BASED ON 1.73 SQ M-ARVRAT: 57.1 ML/MIN
GLUCOSE SERPL-MCNC: 241 MG/DL (ref 70–99)
HCT VFR BLD CALC: 38.5 % (ref 39–53)
HGB BLD-MCNC: 13.1 G/DL (ref 13–17.5)
LYMPHOCYTES # BLD: 0.8 X10^3/UL (ref 1–4.8)
LYMPHOCYTES NFR BLD AUTO: 17 % (ref 24–48)
MAGNESIUM SERPL-MCNC: 1.4 MG/DL (ref 1.8–2.4)
MCH RBC QN AUTO: 31 PG (ref 25–35)
MCHC RBC AUTO-ENTMCNC: 34 G/DL (ref 31–37)
MCV RBC AUTO: 90 FL (ref 79–100)
MONO #: 0.7 X10^3/UL (ref 0–1.1)
MONOCYTES NFR BLD: 15 % (ref 0–9)
NEUT #: 3.3 X10^3/UL (ref 1.8–7.7)
NEUTROPHILS NFR BLD AUTO: 67 % (ref 31–73)
PLATELET # BLD AUTO: 226 X10^3/UL (ref 140–400)
POTASSIUM SERPL-SCNC: 4.3 MMOL/L (ref 3.5–5.1)
PROT SERPL-MCNC: 6.8 G/DL (ref 6.4–8.2)
RBC # BLD AUTO: 4.27 X10^6/UL (ref 4.3–5.7)
SODIUM SERPL-SCNC: 138 MMOL/L (ref 136–145)
WBC # BLD AUTO: 4.9 X10^3/UL (ref 4–11)

## 2021-05-10 PROCEDURE — 83735 ASSAY OF MAGNESIUM: CPT

## 2021-05-10 PROCEDURE — 85025 COMPLETE CBC W/AUTO DIFF WBC: CPT

## 2021-05-10 PROCEDURE — 36415 COLL VENOUS BLD VENIPUNCTURE: CPT

## 2021-05-10 PROCEDURE — 80053 COMPREHEN METABOLIC PANEL: CPT

## 2021-05-17 ENCOUNTER — HOSPITAL ENCOUNTER (OUTPATIENT)
Dept: HOSPITAL 61 - ONCLAB | Age: 56
End: 2021-05-17
Attending: INTERNAL MEDICINE
Payer: COMMERCIAL

## 2021-05-17 DIAGNOSIS — C34.12: Primary | ICD-10-CM

## 2021-05-17 LAB
ALBUMIN SERPL-MCNC: 3.7 G/DL (ref 3.4–5)
ALBUMIN/GLOB SERPL: 1.2 {RATIO} (ref 1–1.7)
ALP SERPL-CCNC: 100 U/L (ref 46–116)
ALT SERPL-CCNC: 33 U/L (ref 16–63)
ANION GAP SERPL CALC-SCNC: 9 MMOL/L (ref 6–14)
AST SERPL-CCNC: 24 U/L (ref 15–37)
BASOPHILS # BLD AUTO: 0 X10^3/UL (ref 0–0.2)
BASOPHILS NFR BLD: 1 % (ref 0–3)
BILIRUB SERPL-MCNC: 0.2 MG/DL (ref 0.2–1)
BUN SERPL-MCNC: 17 MG/DL (ref 8–26)
BUN/CREAT SERPL: 15 (ref 6–20)
CALCIUM SERPL-MCNC: 8.6 MG/DL (ref 8.5–10.1)
CHLORIDE SERPL-SCNC: 104 MMOL/L (ref 98–107)
CO2 SERPL-SCNC: 25 MMOL/L (ref 21–32)
CREAT SERPL-MCNC: 1.1 MG/DL (ref 0.7–1.3)
EOSINOPHIL NFR BLD: 0 X10^3/UL (ref 0–0.7)
EOSINOPHIL NFR BLD: 1 % (ref 0–3)
ERYTHROCYTE [DISTWIDTH] IN BLOOD BY AUTOMATED COUNT: 19.8 % (ref 11.5–14.5)
GFR SERPLBLD BASED ON 1.73 SQ M-ARVRAT: 69.2 ML/MIN
GLUCOSE SERPL-MCNC: 143 MG/DL (ref 70–99)
HCT VFR BLD CALC: 38.3 % (ref 39–53)
HGB BLD-MCNC: 12.5 G/DL (ref 13–17.5)
LYMPHOCYTES # BLD: 0.8 X10^3/UL (ref 1–4.8)
LYMPHOCYTES NFR BLD AUTO: 13 % (ref 24–48)
MCH RBC QN AUTO: 30 PG (ref 25–35)
MCHC RBC AUTO-ENTMCNC: 33 G/DL (ref 31–37)
MCV RBC AUTO: 92 FL (ref 79–100)
MONO #: 1 X10^3/UL (ref 0–1.1)
MONOCYTES NFR BLD: 15 % (ref 0–9)
NEUT #: 4.8 X10^3/UL (ref 1.8–7.7)
NEUTROPHILS NFR BLD AUTO: 71 % (ref 31–73)
PLATELET # BLD AUTO: 190 X10^3/UL (ref 140–400)
POTASSIUM SERPL-SCNC: 4.4 MMOL/L (ref 3.5–5.1)
PROT SERPL-MCNC: 6.9 G/DL (ref 6.4–8.2)
RBC # BLD AUTO: 4.15 X10^6/UL (ref 4.3–5.7)
SODIUM SERPL-SCNC: 138 MMOL/L (ref 136–145)
WBC # BLD AUTO: 6.7 X10^3/UL (ref 4–11)

## 2021-05-17 PROCEDURE — 80053 COMPREHEN METABOLIC PANEL: CPT

## 2021-05-17 PROCEDURE — 85025 COMPLETE CBC W/AUTO DIFF WBC: CPT

## 2021-05-17 PROCEDURE — 36415 COLL VENOUS BLD VENIPUNCTURE: CPT

## 2021-06-17 ENCOUNTER — HOSPITAL ENCOUNTER (OUTPATIENT)
Dept: HOSPITAL 61 - KCIC CT | Age: 56
End: 2021-06-17
Attending: INTERNAL MEDICINE
Payer: COMMERCIAL

## 2021-06-17 DIAGNOSIS — R91.1: ICD-10-CM

## 2021-06-17 DIAGNOSIS — C34.12: Primary | ICD-10-CM

## 2021-06-17 PROCEDURE — 71275 CT ANGIOGRAPHY CHEST: CPT

## 2021-06-17 NOTE — KCIC
Exam: CT of chest with contrast



INDICATION: Lung cancer



TECHNIQUE: Sequential axial images through the chest obtained following the administration of 100 mL 
of Omni 300 IV contrast. Sagittal and coronal reformatted images were reconstructed from the axial da
ta and reviewed. 3-D reformatted images were reconstructed from the axial data and reviewed.



Exposure: One or more of the following in the visualized dose reduction techniques were utilized for 
this examination:

1.  Automated exposure control

2.  Adjustment of the MA and/or KV according to patient size

3.  Use of iterative of reconstructive technique



Comparisons: 4/26/2021



FINDINGS:

Visualized portions of the thyroid are unremarkable. No enlarged mediastinal lymph nodes are identifi
ed.



Heart size is normal. No pericardial effusion. Thoracic aorta has a normal course and caliber. Pulmon
leandro artery is not enlarged. No pulmonary embolus identified within the main, lobar or segmental pulmo
nary arteries are identified.



Postoperative changes of left upper lobectomy are again seen. Faint linear filling defect within a se
gmental branch in the right upper lobe series 6 image 246. No consolidation or pneumothorax. Several 
groundglass nodules noted in the right middle lobe, similar to the prior exam.



No pleural effusion or thickening.



Visualized upper abdomen is unremarkable.



No suspicious osseous lesions or acute fractures.



IMPRESSION:

1.  No acute pulmonary embolus identified within the main, lobar or segmental pulmonary arteries. The
re is a faint linear filling defect noted within the right upper lobe segmental pulmonary artery seen
 on series 6 image 248, likely sequela of prior thromboembolic disease.

2.  Stable pulmonary nodules when compared to the recent prior exam.





Electronically signed by: Anamika Pickens MD (6/17/2021 3:59 PM) [facility]

## 2021-06-23 ENCOUNTER — HOSPITAL ENCOUNTER (OUTPATIENT)
Dept: HOSPITAL 61 - ONCLAB | Age: 56
End: 2021-06-23
Attending: PHYSICIAN ASSISTANT
Payer: COMMERCIAL

## 2021-06-23 DIAGNOSIS — C34.12: Primary | ICD-10-CM

## 2021-06-23 LAB
ALBUMIN SERPL-MCNC: 3.9 G/DL (ref 3.4–5)
ALBUMIN/GLOB SERPL: 1.3 {RATIO} (ref 1–1.7)
ALP SERPL-CCNC: 95 U/L (ref 46–116)
ALT SERPL-CCNC: 38 U/L (ref 16–63)
ANION GAP SERPL CALC-SCNC: 10 MMOL/L (ref 6–14)
AST SERPL-CCNC: 20 U/L (ref 15–37)
BASOPHILS # BLD AUTO: 0 X10^3/UL (ref 0–0.2)
BASOPHILS NFR BLD: 1 % (ref 0–3)
BILIRUB SERPL-MCNC: 0.4 MG/DL (ref 0.2–1)
BUN SERPL-MCNC: 15 MG/DL (ref 8–26)
BUN/CREAT SERPL: 14 (ref 6–20)
CALCIUM SERPL-MCNC: 8.6 MG/DL (ref 8.5–10.1)
CHLORIDE SERPL-SCNC: 105 MMOL/L (ref 98–107)
CO2 SERPL-SCNC: 26 MMOL/L (ref 21–32)
CREAT SERPL-MCNC: 1.1 MG/DL (ref 0.7–1.3)
EOSINOPHIL NFR BLD: 0.2 X10^3/UL (ref 0–0.7)
EOSINOPHIL NFR BLD: 5 % (ref 0–3)
ERYTHROCYTE [DISTWIDTH] IN BLOOD BY AUTOMATED COUNT: 18.8 % (ref 11.5–14.5)
GFR SERPLBLD BASED ON 1.73 SQ M-ARVRAT: 69.2 ML/MIN
GLUCOSE SERPL-MCNC: 117 MG/DL (ref 70–99)
HCT VFR BLD CALC: 39.2 % (ref 39–53)
HGB BLD-MCNC: 13.1 G/DL (ref 13–17.5)
LYMPHOCYTES # BLD: 0.8 X10^3/UL (ref 1–4.8)
LYMPHOCYTES NFR BLD AUTO: 17 % (ref 24–48)
MAGNESIUM SERPL-MCNC: 1.9 MG/DL (ref 1.8–2.4)
MCH RBC QN AUTO: 32 PG (ref 25–35)
MCHC RBC AUTO-ENTMCNC: 33 G/DL (ref 31–37)
MCV RBC AUTO: 95 FL (ref 79–100)
MONO #: 0.6 X10^3/UL (ref 0–1.1)
MONOCYTES NFR BLD: 14 % (ref 0–9)
NEUT #: 2.8 X10^3/UL (ref 1.8–7.7)
NEUTROPHILS NFR BLD AUTO: 63 % (ref 31–73)
PLATELET # BLD AUTO: 208 X10^3/UL (ref 140–400)
POTASSIUM SERPL-SCNC: 4 MMOL/L (ref 3.5–5.1)
PROT SERPL-MCNC: 7 G/DL (ref 6.4–8.2)
RBC # BLD AUTO: 4.15 X10^6/UL (ref 4.3–5.7)
SODIUM SERPL-SCNC: 141 MMOL/L (ref 136–145)
WBC # BLD AUTO: 4.5 X10^3/UL (ref 4–11)

## 2021-06-23 PROCEDURE — 80053 COMPREHEN METABOLIC PANEL: CPT

## 2021-06-23 PROCEDURE — 85025 COMPLETE CBC W/AUTO DIFF WBC: CPT

## 2021-06-23 PROCEDURE — 83735 ASSAY OF MAGNESIUM: CPT

## 2021-06-23 PROCEDURE — 36415 COLL VENOUS BLD VENIPUNCTURE: CPT

## 2021-09-27 ENCOUNTER — HOSPITAL ENCOUNTER (OUTPATIENT)
Dept: HOSPITAL 61 - CT | Age: 56
End: 2021-09-27
Attending: PHYSICIAN ASSISTANT
Payer: COMMERCIAL

## 2021-09-27 DIAGNOSIS — I26.99: ICD-10-CM

## 2021-09-27 DIAGNOSIS — R91.8: ICD-10-CM

## 2021-09-27 DIAGNOSIS — C34.12: Primary | ICD-10-CM

## 2021-09-27 PROCEDURE — 71260 CT THORAX DX C+: CPT

## 2021-09-27 PROCEDURE — 93970 EXTREMITY STUDY: CPT

## 2021-09-27 NOTE — RAD
EXAMINATION:  US BILATERAL LOWEREXTREMITY VENOUS DOPPLER, 9/27/2021 2:29 PM



CLINICAL INDICATION:  History of right leg DVT



COMPARISON: Bilateral lower extremity ultrasound 4/27/2021



PROCEDURE: Multiple grayscale, color Doppler and spectral Doppler sonographic images of the bilateral
 lower extremity were obtained.



FINDINGS: The right popliteal vein is noncompressible distally with no color or spectral Doppler flow
 consistent with occlusive thrombosis. This extends into the one of the proximal peroneal veins. This
 was previously partially occlusive. The right common femoral, superficial femoral, and remaining senia
f veins are patent.



The left, femoral, femoral and popliteal veins are echolucent with normal flow on color Doppler imagi
ng. The veins are fully compressible and show normal phasicity and reaction to augmentation. The left
 calf veins are also normal in appearance.



IMPRESSION:

1. Persistent, now occlusive deep venous thrombosis in the distal right popliteal vein extending into
 one of the proximal peroneal vein.

2. No deep venous thrombosis in the left lower extremity.



Results were discussed by the sonographer with Dr. Skinner at the time of the exam.



Electronically signed by: Mere Almanzar MD (9/27/2021 3:08 PM) [id number]

## 2021-09-27 NOTE — RAD
CT of the chest with contrast 9/27/2021



INDICATION: Lung cancer, follow-up.



COMPARISON STUDY: CT of the chest with contrast June 17, 2021.



TECHNIQUE: Multidetector CT imaging of the chest performed following the administration of contrast



FINDINGS: 



Heart size is normal. No pericardial effusion is identified. No pathologically enlarged mediastinal l
ymph nodes are identified. There is a right internal jugular port with tip at the cavoatrial junction
.



There is no pneumothorax or pleural effusion. Postsurgical changes in the left hilum noted. 



Multiple small nodules in the anterior inferior left lung with surrounding groundglass halos appear s
lightly more prominent than on comparison study. Reference lesion can be seen on axial image 43,  iker
suring 5 mm in diameter on today's exam and appearing to measure 3 mm on comparison exam from June 20
21. 



There is a groundglass nodular opacification in the right upper lobe measuring 7 mm on today's exam m
easuring 5 to 6 mm on comparison study (axial image 21). Groundglass opacities in the right middle lo
be are slightly more prominent including a slightly more solid nodule with groundglass halo measuring
 11 mm in diameter previously measuring 8 mm in diameter when measured in a comparable fashion.



No acute osseous changes are identified.



IMPRESSION: Multiple bilateral pulmonary nodules appear slightly increased with respect to most recen
t comparison studies. The appearance raises concern for progression of metastatic disease.





CT DOSING PQRS STATEMENT: 

One or more of the following individualized dose reduction techniques were utilized for this examinat
ion: 

 1. Automated exposure control 

 2. Adjustment of the mA and/or kV according to patient size  

3. Use of iterative reconstruction technique



Electronically signed by: Dean Luna MD (9/27/2021 3:53 PM) [id number]

## 2021-09-29 ENCOUNTER — HOSPITAL ENCOUNTER (OUTPATIENT)
Dept: HOSPITAL 61 - ONCLAB | Age: 56
End: 2021-09-29
Attending: INTERNAL MEDICINE
Payer: COMMERCIAL

## 2021-09-29 DIAGNOSIS — C34.12: Primary | ICD-10-CM

## 2021-09-29 LAB
ALBUMIN SERPL-MCNC: 3.6 G/DL (ref 3.4–5)
ALBUMIN/GLOB SERPL: 1 {RATIO} (ref 1–1.7)
ALP SERPL-CCNC: 101 U/L (ref 46–116)
ALT SERPL-CCNC: 51 U/L (ref 16–63)
ANION GAP SERPL CALC-SCNC: 6 MMOL/L (ref 6–14)
AST SERPL-CCNC: 26 U/L (ref 15–37)
BASOPHILS # BLD AUTO: 0 X10^3/UL (ref 0–0.2)
BASOPHILS NFR BLD: 1 % (ref 0–3)
BILIRUB SERPL-MCNC: 0.2 MG/DL (ref 0.2–1)
BUN SERPL-MCNC: 14 MG/DL (ref 8–26)
BUN/CREAT SERPL: 13 (ref 6–20)
CALCIUM SERPL-MCNC: 8.8 MG/DL (ref 8.5–10.1)
CHLORIDE SERPL-SCNC: 105 MMOL/L (ref 98–107)
CO2 SERPL-SCNC: 29 MMOL/L (ref 21–32)
CREAT SERPL-MCNC: 1.1 MG/DL (ref 0.7–1.3)
EOSINOPHIL NFR BLD: 0.5 X10^3/UL (ref 0–0.7)
EOSINOPHIL NFR BLD: 10 % (ref 0–3)
ERYTHROCYTE [DISTWIDTH] IN BLOOD BY AUTOMATED COUNT: 14.2 % (ref 11.5–14.5)
GFR SERPLBLD BASED ON 1.73 SQ M-ARVRAT: 69.2 ML/MIN
GLUCOSE SERPL-MCNC: 108 MG/DL (ref 70–99)
HCT VFR BLD CALC: 43.9 % (ref 39–53)
HGB BLD-MCNC: 14.5 G/DL (ref 13–17.5)
LYMPHOCYTES # BLD: 1 X10^3/UL (ref 1–4.8)
LYMPHOCYTES NFR BLD AUTO: 18 % (ref 24–48)
MCH RBC QN AUTO: 31 PG (ref 25–35)
MCHC RBC AUTO-ENTMCNC: 33 G/DL (ref 31–37)
MCV RBC AUTO: 93 FL (ref 79–100)
MONO #: 0.8 X10^3/UL (ref 0–1.1)
MONOCYTES NFR BLD: 16 % (ref 0–9)
NEUT #: 2.9 X10^3/UL (ref 1.8–7.7)
NEUTROPHILS NFR BLD AUTO: 55 % (ref 31–73)
PLATELET # BLD AUTO: 224 X10^3/UL (ref 140–400)
POTASSIUM SERPL-SCNC: 4.1 MMOL/L (ref 3.5–5.1)
PROT SERPL-MCNC: 7.1 G/DL (ref 6.4–8.2)
RBC # BLD AUTO: 4.7 X10^6/UL (ref 4.3–5.7)
SODIUM SERPL-SCNC: 140 MMOL/L (ref 136–145)
WBC # BLD AUTO: 5.3 X10^3/UL (ref 4–11)

## 2021-09-29 PROCEDURE — 85025 COMPLETE CBC W/AUTO DIFF WBC: CPT

## 2021-09-29 PROCEDURE — 80053 COMPREHEN METABOLIC PANEL: CPT

## 2021-09-29 PROCEDURE — 36415 COLL VENOUS BLD VENIPUNCTURE: CPT

## 2021-10-14 ENCOUNTER — HOSPITAL ENCOUNTER (OUTPATIENT)
Dept: HOSPITAL 61 - ONCLAB | Age: 56
End: 2021-10-14
Attending: INTERNAL MEDICINE
Payer: COMMERCIAL

## 2021-10-14 DIAGNOSIS — C34.12: Primary | ICD-10-CM

## 2021-10-14 LAB
ALBUMIN SERPL-MCNC: 3.6 G/DL (ref 3.4–5)
ALBUMIN/GLOB SERPL: 1.1 {RATIO} (ref 1–1.7)
ALP SERPL-CCNC: 93 U/L (ref 46–116)
ALT SERPL-CCNC: 47 U/L (ref 16–63)
ANION GAP SERPL CALC-SCNC: 5 MMOL/L (ref 6–14)
AST SERPL-CCNC: 27 U/L (ref 15–37)
BASOPHILS # BLD AUTO: 0.1 X10^3/UL (ref 0–0.2)
BASOPHILS NFR BLD: 1 % (ref 0–3)
BILIRUB SERPL-MCNC: 0.4 MG/DL (ref 0.2–1)
BUN SERPL-MCNC: 16 MG/DL (ref 8–26)
BUN/CREAT SERPL: 15 (ref 6–20)
CALCIUM SERPL-MCNC: 8.7 MG/DL (ref 8.5–10.1)
CHLORIDE SERPL-SCNC: 104 MMOL/L (ref 98–107)
CO2 SERPL-SCNC: 29 MMOL/L (ref 21–32)
CREAT SERPL-MCNC: 1.1 MG/DL (ref 0.7–1.3)
EOSINOPHIL NFR BLD: 0.4 X10^3/UL (ref 0–0.7)
EOSINOPHIL NFR BLD: 8 % (ref 0–3)
ERYTHROCYTE [DISTWIDTH] IN BLOOD BY AUTOMATED COUNT: 14.5 % (ref 11.5–14.5)
GFR SERPLBLD BASED ON 1.73 SQ M-ARVRAT: 69.2 ML/MIN
GLUCOSE SERPL-MCNC: 104 MG/DL (ref 70–99)
HCT VFR BLD CALC: 41.4 % (ref 39–53)
HGB BLD-MCNC: 14 G/DL (ref 13–17.5)
LYMPHOCYTES # BLD: 0.9 X10^3/UL (ref 1–4.8)
LYMPHOCYTES NFR BLD AUTO: 18 % (ref 24–48)
MCH RBC QN AUTO: 31 PG (ref 25–35)
MCHC RBC AUTO-ENTMCNC: 34 G/DL (ref 31–37)
MCV RBC AUTO: 92 FL (ref 79–100)
MONO #: 0.7 X10^3/UL (ref 0–1.1)
MONOCYTES NFR BLD: 13 % (ref 0–9)
NEUT #: 3.2 X10^3/UL (ref 1.8–7.7)
NEUTROPHILS NFR BLD AUTO: 60 % (ref 31–73)
PLATELET # BLD AUTO: 208 X10^3/UL (ref 140–400)
POTASSIUM SERPL-SCNC: 4.3 MMOL/L (ref 3.5–5.1)
PROT SERPL-MCNC: 6.8 G/DL (ref 6.4–8.2)
RBC # BLD AUTO: 4.49 X10^6/UL (ref 4.3–5.7)
SODIUM SERPL-SCNC: 138 MMOL/L (ref 136–145)
WBC # BLD AUTO: 5.3 X10^3/UL (ref 4–11)

## 2021-10-14 PROCEDURE — 85025 COMPLETE CBC W/AUTO DIFF WBC: CPT

## 2021-10-14 PROCEDURE — 36415 COLL VENOUS BLD VENIPUNCTURE: CPT

## 2021-10-14 PROCEDURE — 80053 COMPREHEN METABOLIC PANEL: CPT

## 2022-01-06 ENCOUNTER — HOSPITAL ENCOUNTER (OUTPATIENT)
Dept: HOSPITAL 61 - KCIC CT | Age: 57
End: 2022-01-06
Attending: INTERNAL MEDICINE
Payer: COMMERCIAL

## 2022-01-06 DIAGNOSIS — R91.8: Primary | ICD-10-CM

## 2022-01-06 DIAGNOSIS — Z90.2: ICD-10-CM

## 2022-01-06 PROCEDURE — 71250 CT THORAX DX C-: CPT

## 2022-01-07 NOTE — [facility]
Noncontrast CT scan of the chest compared to similar exam dated September 27 of 2021 for adenocarcino
ma of the left lung.



TECHNIQUE: Contiguous axial CT images are obtained through the chest. Sagittal and coronal reformatio
ns are evaluated.



FINDINGS: Port-A-Cath is present. There are changes of left upper lobectomy. There is no suspicious m
ediastinal, hilar, or axillary lymphadenopathy. Coronary artery calcifications are present. Heart siz
e within normal limits. No gross morphologic abnormalities of the visualized upper abdominal organs, 
though evaluation is limited by lack of IV contrast. There are postsurgical changes of rib resection 
on the left. Numerous bilateral small subpleural pulmonary nodules with groundglass halos are again r
edemonstrated and appear grossly unchanged from prior CT scan of September 27, 2021. Areas of pleural
 thickening in the left lung base are also stable. No new lung nodules or masses are seen. No pleural
 effusions.



IMPRESSION:

1. Stable appearance of multiple bilateral pulmonary nodules with groundglass halos. No new lung nodu
les or masses.

2. Other stable chronic and postsurgical changes as described.



Electronically signed by: Tito Perez MD (1/7/2022 9:19 AM) [id number]



[facility] Compliance Statement:



One or more of the following individualized dose reduction techniques were utilized for this examinat
ion:  

1. Automated exposure control  

2. Adjustment of the mA and/or kV according to patient size  

3. Use of iterative reconstruction technique

## 2022-04-07 ENCOUNTER — HOSPITAL ENCOUNTER (OUTPATIENT)
Dept: HOSPITAL 61 - CT | Age: 57
End: 2022-04-07
Attending: INTERNAL MEDICINE
Payer: COMMERCIAL

## 2022-04-07 DIAGNOSIS — C34.12: Primary | ICD-10-CM

## 2022-04-07 DIAGNOSIS — I89.8: ICD-10-CM

## 2022-04-07 DIAGNOSIS — R91.8: ICD-10-CM

## 2022-04-07 DIAGNOSIS — Z90.2: ICD-10-CM

## 2022-04-07 DIAGNOSIS — I25.10: ICD-10-CM

## 2022-04-07 PROCEDURE — 71250 CT THORAX DX C-: CPT

## 2022-04-07 NOTE — RAD
EXAMINATION: CT Chest Without IV contrast.



INDICATION:56 years, Male, lung adenocarcinoma. Follow-up exam.



COMPARISON: 1/6/2022. 



TECHNIQUE: Spiral CT was obtained from the jugular notch through the posterior costophrenic recess. 3
-D MIPS, sagittal and coronal reformats were obtained.



Exposure: One or more of the following individualized dose reduction techniques were utilized for thi
s examination:  

1. Automated exposure control  

2. Adjustment of the mA and/or kV according to patient size  

3. Use of iterative reconstruction technique.



FINDINGS:

LUNGS/PLEURA: Central airways are patent. Left upper lobectomy changes. Redemonstrated multiple right
 middle and left lower lobe pulmonary nodules; some of them demonstrate groundglass halos. The left l
ower lobe pulmonary nodules are minimally increasing in size since prior exam. For example, posterior
 left lower lobe pulmonary nodule in series 3 image 41 measures measures 0.7 cm, previously 0.5 cm. T
he right middle lobe pulmonary nodules are unchanged since prior exam. Similar areas of pleural thick
ening in the left lung base. Linear scarring in the right upper and middle lobes. 



MEDIASTINUM: No pathologic mediastinal or hilar adenopathy. Calcified left hilar lymph nodes. The tho
racic aorta and pulmonary arteries are normal in caliber. The heart is normal in size. No pericardial
 effusion. Mild calcified coronary atherosclerosis. The visualized thyroid and the esophagus are unre
markable. Right Mediport catheter remains unchanged in position.



AXILLA/SOFT TISSUE: No supraclavicular or axillary adenopathy. Regional soft tissues are within baldemar
l limits.



UPPER ABDOMEN: The visualized upper abdomen appears unremarkable, within the limitation of noncontras
t exam.



BONES: No evidence of acute fractures or aggressive osseous lesions. Similar postoperative changes of
 the left rib resection.



IMPRESSION:

1.  Redemonstrated multiple bilateral pulmonary nodules, the left lower lobe pulmonary nodules are mi
nimally increasing in size since prior exam. The right middle lobe pulmonary nodules are unchanged si
nce prior exam. Continued attention on short-term follow-up exam.

2.  Similar postoperative changes of left upper lobectomy.



Electronically signed by: Anselmo Kaur MD (4/7/2022 11:00 AM) [facility]